# Patient Record
Sex: MALE | Race: OTHER | HISPANIC OR LATINO | URBAN - METROPOLITAN AREA
[De-identification: names, ages, dates, MRNs, and addresses within clinical notes are randomized per-mention and may not be internally consistent; named-entity substitution may affect disease eponyms.]

---

## 2017-03-31 ENCOUNTER — OUTPATIENT (OUTPATIENT)
Dept: OUTPATIENT SERVICES | Facility: HOSPITAL | Age: 77
LOS: 1 days | Discharge: HOME | End: 2017-03-31

## 2017-06-27 DIAGNOSIS — I10 ESSENTIAL (PRIMARY) HYPERTENSION: ICD-10-CM

## 2017-06-27 DIAGNOSIS — E78.2 MIXED HYPERLIPIDEMIA: ICD-10-CM

## 2017-06-27 DIAGNOSIS — E66.9 OBESITY, UNSPECIFIED: ICD-10-CM

## 2017-07-24 ENCOUNTER — OUTPATIENT (OUTPATIENT)
Dept: OUTPATIENT SERVICES | Facility: HOSPITAL | Age: 77
LOS: 1 days | Discharge: HOME | End: 2017-07-24

## 2017-07-25 DIAGNOSIS — K02.53 DENTAL CARIES ON PIT AND FISSURE SURFACE PENETRATING INTO PULP: ICD-10-CM

## 2017-10-18 ENCOUNTER — EMERGENCY (EMERGENCY)
Facility: HOSPITAL | Age: 77
LOS: 0 days | Discharge: HOME | End: 2017-10-18

## 2017-10-18 DIAGNOSIS — N23 UNSPECIFIED RENAL COLIC: ICD-10-CM

## 2017-10-18 DIAGNOSIS — R10.31 RIGHT LOWER QUADRANT PAIN: ICD-10-CM

## 2017-10-18 DIAGNOSIS — R00.1 BRADYCARDIA, UNSPECIFIED: ICD-10-CM

## 2017-10-18 DIAGNOSIS — E78.00 PURE HYPERCHOLESTEROLEMIA, UNSPECIFIED: ICD-10-CM

## 2017-10-18 DIAGNOSIS — Z79.899 OTHER LONG TERM (CURRENT) DRUG THERAPY: ICD-10-CM

## 2017-12-14 ENCOUNTER — EMERGENCY (EMERGENCY)
Facility: HOSPITAL | Age: 77
LOS: 0 days | Discharge: HOME | End: 2017-12-14
Admitting: FAMILY MEDICINE
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
## 2017-12-14 DIAGNOSIS — M79.89 OTHER SPECIFIED SOFT TISSUE DISORDERS: ICD-10-CM

## 2017-12-14 DIAGNOSIS — E78.00 PURE HYPERCHOLESTEROLEMIA, UNSPECIFIED: ICD-10-CM
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
## 2017-12-14 DIAGNOSIS — Z79.899 OTHER LONG TERM (CURRENT) DRUG THERAPY: ICD-10-CM
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
## 2017-12-27 ENCOUNTER — OUTPATIENT (OUTPATIENT)
Dept: OUTPATIENT SERVICES | Facility: HOSPITAL | Age: 77
LOS: 1 days | Discharge: HOME | End: 2017-12-27

## 2017-12-27 DIAGNOSIS — E78.2 MIXED HYPERLIPIDEMIA: ICD-10-CM

## 2017-12-27 DIAGNOSIS — I10 ESSENTIAL (PRIMARY) HYPERTENSION: ICD-10-CM

## 2018-02-06 ENCOUNTER — OUTPATIENT (OUTPATIENT)
Dept: OUTPATIENT SERVICES | Facility: HOSPITAL | Age: 78
LOS: 1 days | Discharge: HOME | End: 2018-02-06

## 2018-03-09 ENCOUNTER — OUTPATIENT (OUTPATIENT)
Dept: OUTPATIENT SERVICES | Facility: HOSPITAL | Age: 78
LOS: 1 days | Discharge: HOME | End: 2018-03-09

## 2018-04-06 ENCOUNTER — OUTPATIENT (OUTPATIENT)
Dept: OUTPATIENT SERVICES | Facility: HOSPITAL | Age: 78
LOS: 1 days | Discharge: HOME | End: 2018-04-06

## 2018-04-06 DIAGNOSIS — E78.00 PURE HYPERCHOLESTEROLEMIA, UNSPECIFIED: ICD-10-CM

## 2018-04-06 DIAGNOSIS — Z00.00 ENCOUNTER FOR GENERAL ADULT MEDICAL EXAMINATION WITHOUT ABNORMAL FINDINGS: ICD-10-CM

## 2018-04-06 DIAGNOSIS — I10 ESSENTIAL (PRIMARY) HYPERTENSION: ICD-10-CM

## 2018-04-24 ENCOUNTER — OUTPATIENT (OUTPATIENT)
Dept: OUTPATIENT SERVICES | Facility: HOSPITAL | Age: 78
LOS: 1 days | Discharge: HOME | End: 2018-04-24

## 2018-04-24 DIAGNOSIS — K02.53 DENTAL CARIES ON PIT AND FISSURE SURFACE PENETRATING INTO PULP: ICD-10-CM

## 2018-05-27 ENCOUNTER — INPATIENT (INPATIENT)
Facility: HOSPITAL | Age: 78
LOS: 1 days | Discharge: HOME HEALTH | End: 2018-05-29
Attending: HOSPITALIST | Admitting: HOSPITALIST

## 2018-05-27 VITALS
SYSTOLIC BLOOD PRESSURE: 177 MMHG | TEMPERATURE: 96 F | RESPIRATION RATE: 18 BRPM | OXYGEN SATURATION: 99 % | DIASTOLIC BLOOD PRESSURE: 76 MMHG | HEART RATE: 52 BPM

## 2018-05-27 DIAGNOSIS — Z98.890 OTHER SPECIFIED POSTPROCEDURAL STATES: Chronic | ICD-10-CM

## 2018-05-27 LAB
ALBUMIN SERPL ELPH-MCNC: 4.1 G/DL — SIGNIFICANT CHANGE UP (ref 3.5–5.2)
ALP SERPL-CCNC: 73 U/L — SIGNIFICANT CHANGE UP (ref 30–115)
ALT FLD-CCNC: 20 U/L — SIGNIFICANT CHANGE UP (ref 0–41)
ANION GAP SERPL CALC-SCNC: 13 MMOL/L — SIGNIFICANT CHANGE UP (ref 7–14)
APTT BLD: 25.2 SEC — LOW (ref 27–39.2)
AST SERPL-CCNC: 20 U/L — SIGNIFICANT CHANGE UP (ref 0–41)
BASOPHILS # BLD AUTO: 0.02 K/UL — SIGNIFICANT CHANGE UP (ref 0–0.2)
BASOPHILS NFR BLD AUTO: 0.3 % — SIGNIFICANT CHANGE UP (ref 0–1)
BILIRUB SERPL-MCNC: 0.7 MG/DL — SIGNIFICANT CHANGE UP (ref 0.2–1.2)
BUN SERPL-MCNC: 23 MG/DL — HIGH (ref 10–20)
CALCIUM SERPL-MCNC: 8.7 MG/DL — SIGNIFICANT CHANGE UP (ref 8.5–10.1)
CHLORIDE SERPL-SCNC: 107 MMOL/L — SIGNIFICANT CHANGE UP (ref 98–110)
CK MB CFR SERPL CALC: 3.9 NG/ML — SIGNIFICANT CHANGE UP (ref 0.6–6.3)
CK SERPL-CCNC: 306 U/L — HIGH (ref 0–225)
CO2 SERPL-SCNC: 24 MMOL/L — SIGNIFICANT CHANGE UP (ref 17–32)
CREAT SERPL-MCNC: 0.8 MG/DL — SIGNIFICANT CHANGE UP (ref 0.7–1.5)
EOSINOPHIL # BLD AUTO: 0.68 K/UL — SIGNIFICANT CHANGE UP (ref 0–0.7)
EOSINOPHIL NFR BLD AUTO: 9.5 % — HIGH (ref 0–8)
GLUCOSE SERPL-MCNC: 107 MG/DL — HIGH (ref 70–99)
HCT VFR BLD CALC: 39 % — LOW (ref 42–52)
HGB BLD-MCNC: 13.4 G/DL — LOW (ref 14–18)
IMM GRANULOCYTES NFR BLD AUTO: 0.3 % — SIGNIFICANT CHANGE UP (ref 0.1–0.3)
INR BLD: 0.98 RATIO — SIGNIFICANT CHANGE UP (ref 0.65–1.3)
LACTATE SERPL-SCNC: 1.1 MMOL/L — SIGNIFICANT CHANGE UP (ref 0.5–2.2)
LIDOCAIN IGE QN: 30 U/L — SIGNIFICANT CHANGE UP (ref 7–60)
LYMPHOCYTES # BLD AUTO: 2.78 K/UL — SIGNIFICANT CHANGE UP (ref 1.2–3.4)
LYMPHOCYTES # BLD AUTO: 38.7 % — SIGNIFICANT CHANGE UP (ref 20.5–51.1)
MAGNESIUM SERPL-MCNC: 2.2 MG/DL — SIGNIFICANT CHANGE UP (ref 1.8–2.4)
MCHC RBC-ENTMCNC: 30.2 PG — SIGNIFICANT CHANGE UP (ref 27–31)
MCHC RBC-ENTMCNC: 34.4 G/DL — SIGNIFICANT CHANGE UP (ref 32–37)
MCV RBC AUTO: 88 FL — SIGNIFICANT CHANGE UP (ref 80–94)
MONOCYTES # BLD AUTO: 0.64 K/UL — HIGH (ref 0.1–0.6)
MONOCYTES NFR BLD AUTO: 8.9 % — SIGNIFICANT CHANGE UP (ref 1.7–9.3)
NEUTROPHILS # BLD AUTO: 3.05 K/UL — SIGNIFICANT CHANGE UP (ref 1.4–6.5)
NEUTROPHILS NFR BLD AUTO: 42.3 % — SIGNIFICANT CHANGE UP (ref 42.2–75.2)
NRBC # BLD: 0 /100 WBCS — SIGNIFICANT CHANGE UP (ref 0–0)
NT-PROBNP SERPL-SCNC: 261 PG/ML — SIGNIFICANT CHANGE UP (ref 0–300)
PLATELET # BLD AUTO: 185 K/UL — SIGNIFICANT CHANGE UP (ref 130–400)
POTASSIUM SERPL-MCNC: 4.5 MMOL/L — SIGNIFICANT CHANGE UP (ref 3.5–5)
POTASSIUM SERPL-SCNC: 4.5 MMOL/L — SIGNIFICANT CHANGE UP (ref 3.5–5)
PROT SERPL-MCNC: 6.3 G/DL — SIGNIFICANT CHANGE UP (ref 6–8)
PROTHROM AB SERPL-ACNC: 10.6 SEC — SIGNIFICANT CHANGE UP (ref 9.95–12.87)
RBC # BLD: 4.43 M/UL — LOW (ref 4.7–6.1)
RBC # FLD: 13.3 % — SIGNIFICANT CHANGE UP (ref 11.5–14.5)
SODIUM SERPL-SCNC: 144 MMOL/L — SIGNIFICANT CHANGE UP (ref 135–146)
TROPONIN T SERPL-MCNC: <0.01 NG/ML — SIGNIFICANT CHANGE UP
WBC # BLD: 7.19 K/UL — SIGNIFICANT CHANGE UP (ref 4.8–10.8)
WBC # FLD AUTO: 7.19 K/UL — SIGNIFICANT CHANGE UP (ref 4.8–10.8)

## 2018-05-27 RX ORDER — ASPIRIN/CALCIUM CARB/MAGNESIUM 324 MG
325 TABLET ORAL ONCE
Qty: 0 | Refills: 0 | Status: COMPLETED | OUTPATIENT
Start: 2018-05-27 | End: 2018-05-27

## 2018-05-27 RX ORDER — ATORVASTATIN CALCIUM 80 MG/1
80 TABLET, FILM COATED ORAL AT BEDTIME
Qty: 0 | Refills: 0 | Status: DISCONTINUED | OUTPATIENT
Start: 2018-05-27 | End: 2018-05-29

## 2018-05-27 RX ORDER — NIFEDIPINE 30 MG
30 TABLET, EXTENDED RELEASE 24 HR ORAL DAILY
Qty: 0 | Refills: 0 | Status: DISCONTINUED | OUTPATIENT
Start: 2018-05-27 | End: 2018-05-28

## 2018-05-27 RX ORDER — ENOXAPARIN SODIUM 100 MG/ML
40 INJECTION SUBCUTANEOUS EVERY 24 HOURS
Qty: 0 | Refills: 0 | Status: DISCONTINUED | OUTPATIENT
Start: 2018-05-27 | End: 2018-05-29

## 2018-05-27 RX ORDER — TRAMADOL HYDROCHLORIDE 50 MG/1
50 TABLET ORAL ONCE
Qty: 0 | Refills: 0 | Status: DISCONTINUED | OUTPATIENT
Start: 2018-05-27 | End: 2018-05-27

## 2018-05-27 RX ORDER — ASPIRIN/CALCIUM CARB/MAGNESIUM 324 MG
81 TABLET ORAL DAILY
Qty: 0 | Refills: 0 | Status: DISCONTINUED | OUTPATIENT
Start: 2018-05-27 | End: 2018-05-29

## 2018-05-27 RX ORDER — TRAMADOL HYDROCHLORIDE 50 MG/1
50 TABLET ORAL THREE TIMES A DAY
Qty: 0 | Refills: 0 | Status: DISCONTINUED | OUTPATIENT
Start: 2018-05-27 | End: 2018-05-29

## 2018-05-27 RX ADMIN — TRAMADOL HYDROCHLORIDE 50 MILLIGRAM(S): 50 TABLET ORAL at 23:05

## 2018-05-27 RX ADMIN — Medication 325 MILLIGRAM(S): at 21:11

## 2018-05-27 NOTE — CONSULT NOTE ADULT - ATTENDING COMMENTS
Pt interviewed and examined he reports awakening Sunday morning at 5 am with right face, hand and foot numbness, no headache.   He acknowledged pins and needs.  He also reported at least 3 bouts of diarrhea Sunday.  He denies weakness or vertigo, though felt "dizzy".  He is feeling better but still feels a little "numb" on right side.    Neurologic exam at 8:15 on 5/28/18 shows full strength, symmetrically diminished reflexes bilaterally.  Essentially normal FTN and HTS.  No pronator drift.  Equal pain sensation bilaterally but mild decrease LT right face, arm, leg.    He is able to take a few steps.  Church-pike was negative.    Certainly may have been TIA, clearing.  No acute changes on head CT.    Recommend:  hydration given the diarrhea.  Telemetry.   Increase aspirin to 162 mg/day.  High intensity statin.  Tx HTN.  MRI/MRA brain w/o and MRA neck with and without contrast. Pt interviewed and examined he reports awakening Sunday morning at 5 am with right face, hand and foot numbness, no headache.   He acknowledged pins and needs.  He also reported at least 3 bouts of diarrhea Sunday.  He denies weakness or vertigo, though felt "dizzy".  He is feeling better but still feels a little "numb" on right side.    Neurologic exam at 8:15 on 5/28/18 shows full strength, symmetrically diminished reflexes bilaterally.  Essentially normal FTN and HTS.  No pronator drift.  Equal pain sensation bilaterally but mild decrease LT right face, arm, leg.    He is able to take a few steps.  Church-pike was negative.    Certainly may have been TIA, clearing.  No acute changes on head CT.    Recommend:  hydration given the diarrhea.  Telemetry.   Increase aspirin to 162 mg/day.  High intensity statin.  Tx HTN.  MRI/MRA brain w/o and MRA neck with and without contrast.  Okay for stroke unit.  Keep on telemetry.

## 2018-05-27 NOTE — CONSULT NOTE ADULT - SUBJECTIVE AND OBJECTIVE BOX
CC: RIGHT SIDED WEAKNESS AND NUMBNESS    HPI:  79 yo right handed male with pmh of htn, dld, sick sinus syndrome presents with cc of right sided weakness and numbness starting 5 am this morning. Patient states that he woke up at 5 am with numbness to the whole right side then noted that he was  frequently dropping things and had an unsteady gait. Around 11 am he also had chest pain for which he took nitroglycerin  . Symptoms of right  sided weakness and numbness  persisted so family brought him to the ED. Pt has a history of sick sinus syndrome/ bradycardia; pt sees Dr shea; Pt had a cath with nonobstructive findings. Denies speech visual deficits, nausea vomiting dizziness    Home medications  -Nitroglycerin  -nifedipine 30 mg ER Q 24  -Crestor 10mg q 24  -asa 81mg  -vit d     Social history  -Denies smoking alcohol or drug use      Neuro Exam:  Orientation: oriented to person, place time and situation, speech fluent  Cranial Nerves: visual fields full to confrontation, pupils equal round and reactive to light, extraocular motion intact, facial sensation intact symmetrically, face symmetrical, hearing was intact bilaterally, tongue and palate midline, head turning and shoulder shrug symmetric and there was no tongue deviation with protrusion.   Motor: 5/5 Throughout / mild right drift  Sensory exam: decreased sensation to the right face arm and leg  Coordination:. No dysmetria or limb ataxia  Deep tendon reflexes: Diminished  Gait: deferred    NIHSS: 2 ( right sensory loss and mild right ue drift)    Allergies    No Known Allergies    Intolerances      MEDICATIONS  (STANDING):  aspirin enteric coated 81 milliGRAM(s) Oral daily  atorvastatin 80 milliGRAM(s) Oral at bedtime  enoxaparin Injectable 40 milliGRAM(s) SubCutaneous every 24 hours  LORazepam   Injectable 2 milliGRAM(s) IntraMuscular once  NIFEdipine XL 30 milliGRAM(s) Oral daily    MEDICATIONS  (PRN):  traMADol 50 milliGRAM(s) Oral three times a day PRN Moderate Pain (4 - 6)      LABS:                        13.4   7.19  )-----------( 185      ( 27 May 2018 18:25 )             39.0     05-27    144  |  107  |  23<H>  ----------------------------<  107<H>  4.5   |  24  |  0.8    Ca    8.7      27 May 2018 18:25  Mg     2.2     05-27    TPro  6.3  /  Alb  4.1  /  TBili  0.7  /  DBili  x   /  AST  20  /  ALT  20  /  AlkPhos  73  05-27    PT/INR - ( 27 May 2018 18:25 )   PT: 10.60 sec;   INR: 0.98 ratio         PTT - ( 27 May 2018 18:25 )  PTT:25.2 sec        Neuro Imaging:  < from: CT Head No Cont (05.27.18 @ 18:49) >  FINDINGS:  Ventricles and sulci are compatible with parenchymal volume loss.      There is no acute intracranial hemorrhage, extra-axial fluid collections   or midline shift.      There are foci of diminished attenuation in the periventricular and   subcortical white matter which likely represent chronic microvascular   changes.    There is no depressed calvarial fracture. The mastoid air cells are   aerated.  The paranasal sinuses are aerated.    Beam hardening artifact is noted overlying the brain stem and posterior   fossa which is inherent to CT in this location.    IMPRESSION:    No acute intracranial hemorrhage, mass-effector midline shift.        < end of copied text >      EEG:     Echo:   Carotid Doppler: N/A  Telemetry:

## 2018-05-27 NOTE — CONSULT NOTE ADULT - ASSESSMENT
79 yo right handed male with pmh of htn, dld, sick sinus syndrome presents with cc of right sided weakness and numbness starting which was a/w chest pain and unsteady gait since 5 am this morning. NIHSS 2    R/O CVA    PLAN  -N/C MRI BRAIN  -MRA H/N  -ASA 81 MG AND LIPITOR 80 MG  -lipid profile and hbaic  -speech and swallow eval  -pt/rehab  -neuro checks frequently and call for acute change.  -Neuro attending note will follow

## 2018-05-27 NOTE — H&P ADULT - ASSESSMENT
# r/o CVA  - neuro cs  - aspirin & lipitor  - echo, lipid profile  - pt/rehab    # HTN/ DLD    # dvt ppx    # dispo # Right UE weakness; right facial & right UE numbness r/o CVA  - neuro cs  - aspirin & lipitor  - echo, lipid profile  - pt/rehab    # HTN/ DLD  - c/w nifedipine  - switched to lipitor 80mg    # dvt ppx    # dispo # Right UE weakness; right facial & right UE numbness r/o CVA  - neuro cs  - aspirin & lipitor  - echo, lipid profile  - pt/rehab; hba1c  - mri brain; mra head & neck  - neuro checks; tele monitoring    # HTN/ DLD  - c/w nifedipine  - switched to lipitor 80mg    # dvt ppx    # dispo 78 yom with pmh of htn, dld, sick sinus syndrome presents with cc of right sided facial numbness, right UE numbness & weakness since waking up 12 hours PTP at 5am in the [facility] with chest tightness    # Right UE weakness; right facial & right UE numbness r/o CVA  - neuro cs  - aspirin & lipitor  - echo, lipid profile  - pt/rehab; hba1c  - mri brain; mra head & neck  - neuro checks; tele monitoring    # Chest pain/ h/o bradycardia 2/2 sick sinus syndrome  - pt follows with dr shea;   - check echo, check CE * 3  - f/u cardio OP    # HTN/ DLD  - c/w nifedipine  - switched to lipitor 80mg    # dvt ppx  - sc lovenox    # dispo  - home

## 2018-05-27 NOTE — H&P ADULT - HISTORY OF PRESENT ILLNESS
78 yom with pmh of htn, dld, sick sinus syndrome presents with cc of right sided facial numbness, right UE numbness & weakness since waking up 12 hours PTP at 5am in the [facility] ass[facility] with chest tightness  - Pt woke up this morning at 5 am & felt right arm weakness, right UE & right facial numbness; pt was unable to hold things properly with his right hand & had a change in his gait;  - pt also had chest pain associated with his symptoms; His chest pain was similar to his usual chest pain for which he took nitroglycerin; but his chest pain persisted for many hour;  - Pt has a history of sick sinus syndrome/ bradycardia; pt sees Dr shea; pt had a cath with nonobstructive findings; however, pt gets occasional chest pains when he is bradycardic, for which he takes Sl Nitroglycerine which relieves his symptoms    - In Ed- asa 325mg; ct head neg;

## 2018-05-27 NOTE — H&P ADULT - NSHPLABSRESULTS_GEN_ALL_CORE
13.4   7.19  )-----------( 185      ( 27 May 2018 18:25 )             39.0       05-27    144  |  107  |  23<H>  ----------------------------<  107<H>  4.5   |  24  |  0.8    Ca    8.7      27 May 2018 18:25  Mg     2.2     05-27    TPro  6.3  /  Alb  4.1  /  TBili  0.7  /  DBili  x   /  AST  20  /  ALT  20  /  AlkPhos  73  05-27                  PT/INR - ( 27 May 2018 18:25 )   PT: 10.60 sec;   INR: 0.98 ratio         PTT - ( 27 May 2018 18:25 )  PTT:25.2 sec    Lactate Trend  05-27 @ 18:25 Lactate:1.1       CARDIAC MARKERS ( 27 May 2018 18:25 )  x     / <0.01 ng/mL / 306 U/L / x     / 3.9 ng/mL        CAPILLARY BLOOD GLUCOSE 13.4   7.19  )-----------( 185      ( 27 May 2018 18:25 )             39.0     05-27    144  |  107  |  23<H>  ----------------------------<  107<H>  4.5   |  24  |  0.8    Ca    8.7      27 May 2018 18:25  Mg     2.2     05-27    TPro  6.3  /  Alb  4.1  /  TBili  0.7  /  DBili  x   /  AST  20  /  ALT  20  /  AlkPhos  73  05-27        PT/INR - ( 27 May 2018 18:25 )   PT: 10.60 sec;   INR: 0.98 ratio       PTT - ( 27 May 2018 18:25 )  PTT:25.2 sec  Lactate Trend  05-27 @ 18:25 Lactate:1.1     CARDIAC MARKERS ( 27 May 2018 18:25 )  x     / <0.01 ng/mL / 306 U/L / x     / 3.9 ng/mL  < from: CT Head No Cont (05.27.18 @ 18:49) >    No acute intracranial hemorrhage, mass-effector midline shift.    < end of copied text > Vital Signs Last 24 Hrs  T(C): 35.6 (27 May 2018 17:17), Max: 35.6 (27 May 2018 17:17)  T(F): 96.1 (27 May 2018 17:17), Max: 96.1 (27 May 2018 17:17)  HR: 52 (27 May 2018 17:17) (52 - 52)  BP: 177/76 (27 May 2018 17:17) (177/76 - 177/76)  BP(mean): --  RR: 18 (27 May 2018 17:17) (18 - 18)  SpO2: 99% (27 May 2018 17:17) (99% - 99%)                           13.4    7.19  )-----------( 185      ( 27 May 2018 18:25 )             39.0     05-27    144  |  107  |  23<H>  ----------------------------<  107<H>  4.5   |  24  |  0.8    Ca    8.7      27 May 2018 18:25  Mg     2.2     05-27    TPro  6.3  /  Alb  4.1  /  TBili  0.7  /  DBili  x   /  AST  20  /  ALT  20  /  AlkPhos  73  05-27        PT/INR - ( 27 May 2018 18:25 )   PT: 10.60 sec;   INR: 0.98 ratio       PTT - ( 27 May 2018 18:25 )  PTT:25.2 sec  Lactate Trend  05-27 @ 18:25 Lactate:1.1     CARDIAC MARKERS ( 27 May 2018 18:25 )  x     / <0.01 ng/mL / 306 U/L / x     / 3.9 ng/mL  < from: CT Head No Cont (05.27.18 @ 18:49) >    No acute intracranial hemorrhage, mass-effector midline shift.    < end of copied text >

## 2018-05-27 NOTE — ED PROVIDER NOTE - NS ED ROS FT
· Constitutional [-]: no chills, no fever, no night sweats, no weight loss  · CARDIOVASCULAR: normal rate and rhythm, +chest pain and no edema.  · RESPIRATORY: no chest pain, no cough, and no shortness of breath.  · GASTROINTESTINAL: no abdominal pain, no bloating, no constipation, no diarrhea, no nausea and no vomiting.  · GENITOURINARY: no dysuria, no frequency, and no hematuria.  Neuro: R arm, R leg and R facial numbness/weakness  · ROS STATEMENT: all other ROS negative except as per HPI

## 2018-05-27 NOTE — ED PROVIDER NOTE - OBJECTIVE STATEMENT
79 yo M with h/o HTN, HL, bradycardia, renal stones p/w chest pain and neurologic sx. Pt states that since 5am this morning, when he woke up, he developed right facial, right arm and right leg numbness. Pt also c/o weakness to right arm and right leg and claims that he has been dropping things. Pt also states that he has been having intermittent episodes of right chest spasms of sharp pain. Pt states that he has not had similar sx in the past. Pt denies any fever/chills, ha, visual changes, slurred speech, abdominal pain, n/v/d. Pt also states that he has been having intermittent episodes of shortness of breath with exertion today.

## 2018-05-27 NOTE — ED PROVIDER NOTE - PHYSICAL EXAMINATION
· CONSTITUTIONAL: Well appearing, well nourished, awake, alert, oriented to person, place, time/situation and in no apparent distress.  · ENMT: Airway patent, Nasal mucosa clear. Mouth with normal mucosa. Throat has no vesicles, no oropharyngeal exudates and uvula is midline.  · CARDIAC: Normal rate, regular rhythm.  Heart sounds S1, S2.  No murmurs, rubs or gallops. +R chest wall TTP, no rashes noted  · RESPIRATORY: Breath sounds clear and equal bilaterally.  · GASTROINTESTINAL: Abdomen soft, non-tender, no guarding.  · MUSCULOSKELETAL: Spine appears normal, range of motion is not limited, no muscle or joint tenderness  · NEUROLOGICAL: Alert and oriented, CN II-XII intact, motor strength 4/5 in RUE and RLE, +sensation subjectively decreased in right arm/right face, +R pronator drift, +antalgic gait, normal finger to nose  · SKIN: Skin normal color for race, warm, dry and intact. No evidence of rash.  · PSYCHIATRIC: Alert and oriented to person, place, time/situation. normal mood and affect. no apparent risk to self or others.

## 2018-05-27 NOTE — ED PROVIDER NOTE - CARE PLAN
Principal Discharge DX:	Weakness of right arm  Secondary Diagnosis:	Weakness of right leg  Secondary Diagnosis:	Chest pain, unspecified type

## 2018-05-27 NOTE — ED PROVIDER NOTE - PROGRESS NOTE DETAILS
Spoke with neurology, , who states that pt can be placed on tele for further evaluation and will be f/u by neuro. Spoke with  who is also aware of pt.

## 2018-05-27 NOTE — ED PROVIDER NOTE - MEDICAL DECISION MAKING DETAILS
77 yo M with h/o HTN, HL, bradycardia, renal stones p/w chest pain and neurologic sx. Pt states that since 5am this morning, when he woke up, he developed right facial, right arm and right leg numbness and waekness. Pt noted with stable labs and CT. Spoke with neurology who agreed to admit to tele for further eval for cva vs tia

## 2018-05-27 NOTE — H&P ADULT - NSHPPHYSICALEXAM_GEN_ALL_CORE
GENERAL: NAD, speaks in full sentences, no signs of respiratory distress  HEAD:  Atraumatic, Normocephalic  EYES: EOMI, PERRLA, conjunctiva and sclera clear  NECK: Supple, No JVD  CHEST/LUNG: Clear to auscultation bilaterally; No wheeze; No crackles; No accessory muscles used  HEART: Regular rate and rhythm; No murmurs;   ABDOMEN: Soft, Nontender, Nondistended; Bowel sounds present; No guarding  EXTREMITIES:  2+ Peripheral Pulses, No cyanosis or edema  PSYCH: AAOx3  NEUROLOGY: non-focal  SKIN: No rashes or lesions GENERAL: NAD,  HEAD:  Atraumatic, Normocephalic  EYES: EOMI, PERRLA, conjunctiva and sclera clear  NECK: Supple, No JVD  CHEST/LUNG: Clear to auscultation bilaterally; No wheeze; No crackles; No accessory muscles used  HEART: Regular, shi  ABDOMEN: Soft, Nontender, Nondistended; Bowel sounds present; No guarding  EXTREMITIES:  2+ Peripheral Pulses, No cyanosis or edema  PSYCH: AAOx3  NEUROLOGY: rue- weakness 4/5; LUE- 5/5; LLE/RLL- 5/5; right facial & right UE numbness  SKIN: No rashes or lesions

## 2018-05-28 LAB
ANION GAP SERPL CALC-SCNC: 10 MMOL/L — SIGNIFICANT CHANGE UP (ref 7–14)
BUN SERPL-MCNC: 21 MG/DL — HIGH (ref 10–20)
CALCIUM SERPL-MCNC: 8.6 MG/DL — SIGNIFICANT CHANGE UP (ref 8.5–10.1)
CHLORIDE SERPL-SCNC: 105 MMOL/L — SIGNIFICANT CHANGE UP (ref 98–110)
CHOLEST SERPL-MCNC: 165 MG/DL — SIGNIFICANT CHANGE UP (ref 100–200)
CK MB CFR SERPL CALC: 3.4 NG/ML — SIGNIFICANT CHANGE UP (ref 0.6–6.3)
CK MB CFR SERPL CALC: 3.8 NG/ML — SIGNIFICANT CHANGE UP (ref 0.6–6.3)
CK SERPL-CCNC: 218 U/L — SIGNIFICANT CHANGE UP (ref 0–225)
CK SERPL-CCNC: 250 U/L — HIGH (ref 0–225)
CO2 SERPL-SCNC: 25 MMOL/L — SIGNIFICANT CHANGE UP (ref 17–32)
CREAT SERPL-MCNC: 0.9 MG/DL — SIGNIFICANT CHANGE UP (ref 0.7–1.5)
ESTIMATED AVERAGE GLUCOSE: 128 MG/DL — HIGH (ref 68–114)
GLUCOSE SERPL-MCNC: 104 MG/DL — HIGH (ref 70–99)
HBA1C BLD-MCNC: 6.1 % — HIGH (ref 4–5.6)
HCT VFR BLD CALC: 39 % — LOW (ref 42–52)
HDLC SERPL-MCNC: 34 MG/DL — LOW (ref 40–125)
HGB BLD-MCNC: 12.9 G/DL — LOW (ref 14–18)
LIPID PNL WITH DIRECT LDL SERPL: 95 MG/DL — SIGNIFICANT CHANGE UP (ref 4–129)
MCHC RBC-ENTMCNC: 29.5 PG — SIGNIFICANT CHANGE UP (ref 27–31)
MCHC RBC-ENTMCNC: 33.1 G/DL — SIGNIFICANT CHANGE UP (ref 32–37)
MCV RBC AUTO: 89.2 FL — SIGNIFICANT CHANGE UP (ref 80–94)
NRBC # BLD: 0 /100 WBCS — SIGNIFICANT CHANGE UP (ref 0–0)
PLATELET # BLD AUTO: 187 K/UL — SIGNIFICANT CHANGE UP (ref 130–400)
POTASSIUM SERPL-MCNC: 4.4 MMOL/L — SIGNIFICANT CHANGE UP (ref 3.5–5)
POTASSIUM SERPL-SCNC: 4.4 MMOL/L — SIGNIFICANT CHANGE UP (ref 3.5–5)
RBC # BLD: 4.37 M/UL — LOW (ref 4.7–6.1)
RBC # FLD: 13.5 % — SIGNIFICANT CHANGE UP (ref 11.5–14.5)
SODIUM SERPL-SCNC: 140 MMOL/L — SIGNIFICANT CHANGE UP (ref 135–146)
TOTAL CHOLESTEROL/HDL RATIO MEASUREMENT: 4.9 RATIO — SIGNIFICANT CHANGE UP (ref 4–5.5)
TRIGL SERPL-MCNC: 229 MG/DL — HIGH (ref 10–149)
TROPONIN T SERPL-MCNC: <0.01 NG/ML — SIGNIFICANT CHANGE UP
TROPONIN T SERPL-MCNC: <0.01 NG/ML — SIGNIFICANT CHANGE UP
WBC # BLD: 6.38 K/UL — SIGNIFICANT CHANGE UP (ref 4.8–10.8)
WBC # FLD AUTO: 6.38 K/UL — SIGNIFICANT CHANGE UP (ref 4.8–10.8)

## 2018-05-28 RX ORDER — LISINOPRIL 2.5 MG/1
10 TABLET ORAL DAILY
Qty: 0 | Refills: 0 | Status: DISCONTINUED | OUTPATIENT
Start: 2018-05-28 | End: 2018-05-29

## 2018-05-28 RX ADMIN — Medication 81 MILLIGRAM(S): at 11:10

## 2018-05-28 RX ADMIN — Medication 30 MILLIGRAM(S): at 06:04

## 2018-05-28 RX ADMIN — LISINOPRIL 10 MILLIGRAM(S): 2.5 TABLET ORAL at 15:49

## 2018-05-28 RX ADMIN — ENOXAPARIN SODIUM 40 MILLIGRAM(S): 100 INJECTION SUBCUTANEOUS at 06:01

## 2018-05-28 RX ADMIN — Medication 2 MILLIGRAM(S): at 13:06

## 2018-05-28 RX ADMIN — ATORVASTATIN CALCIUM 80 MILLIGRAM(S): 80 TABLET, FILM COATED ORAL at 22:29

## 2018-05-28 NOTE — CONSULT NOTE ADULT - ASSESSMENT
IMPRESSION: Rehab of gait ataxia / r/o cva    PRECAUTIONS: [  ] Cardiac  [  ] Respiratory  [  ] Seizures [  ] Contact Isolation  [  ] Droplet Isolation  [  ] Other    Weight Bearing Status:     RECOMMENDATION:    Out of Bed to Chair     DVT/Decubiti Prophylaxis    REHAB PLAN:     [ x  ] Bedside P/T 3-5 times a week   [ x  ]   Bedside O/T  2-3 times a week             [   ] No Rehab Therapy Indicated                   [ x  ]  Speech Therapy   Conditioning/ROM                                    ADL  Bed Mobility                                               Conditioning/ROM  Transfers                                                     Bed Mobility  Sitting /Standing Balance                         Transfers                                        Gait Training                                               Sitting/Standing Balance  Stair Training [   ]Applicable                    Home equipment Eval                                                                        Splinting  [   ] Only      GOALS:   ADL   [x   ]   Independent                    Transfers  [x   ] Independent                          Ambulation  [ x  ] Independent     [   x ] With device                            [   ]  CG                                                         [   ]  CG                                                                  [   ] CG                            [    ] Min A                                                   [   ] Min A                                                              [   ] Min  A          DISCHARGE PLAN:   [   ]  Good candidate for Intensive Rehabilitation/Hospital based-4A [facility]                                             Will tolerate 3hrs Intensive Rehab Daily                                       [    ]  Short Term Rehab in Skilled Nursing Facility                                       [    ]  Home with Outpatient or  services                                         [  x  ]  Possible Candidate for Intensive Hospital based Rehab

## 2018-05-28 NOTE — PROGRESS NOTE ADULT - ASSESSMENT
78M with Sick Sinus Syndrome, Diverticulosis presents with R facial numbness and weakness, as well as chest pain.     R Facial Numbness and weakness: r/o stroke  Neuro following  MRI, MRA head and neck  Echo: Normal  Currently on ASA and Lipitor   Speech and Swallow Eval   [facility] by Speech and Swallow team      Chest pain:   CE negative x3, no EKG changes     Sick Sinus Syndrome:   Follows Dr. Caraballo as out patient   Currently on Nifedipine  Will discuss with attending possibility of switching to ACE-I for BP control  CCB may be worsening bradycardia     HTN: Elevated  May add or switch completely to ACE-I     Full Code   If Neuro Work up negative likely to be discharged in 78M with Sick Sinus Syndrome, Diverticulosis presents with R facial numbness and weakness, as well as chest pain.     R Facial Numbness and weakness: r/o stroke  Neuro following  MRI, MRA head and neck  Echo: Normal  Currently on ASA and Lipitor   Speech and Swallow Eval   [facility] by Speech and Swallow team      Chest pain:   CE negative x3, no EKG changes     Sick Sinus Syndrome:   Follows Dr. Caraballo as out patient   Currently on Nifedipine  Will discuss with attending possibility of switching to ACE-I for BP control  CCB may be worsening bradycardia     HTN: Elevated  May add or switch completely to ACE-I     Full Code   If Neuro Work up negative likely to be discharged.     Attending Attestation:    Pt seen and examined. Case and Plan discussed with pt, family and resident at bedside. Agree with resident note as corrected above. Pt presented with Right Face Numbness and RUE weakness. Pt was noted to have hr 40's in ER. Pt has SSS and follows with Dr. Sparrow.     Plan:  Telemetry Monitoring  TTE noted per report  f/u MRI Brain   ASA, Statins, BP Control  PT/SNF Evaluation  IF MRI positive for acute CVA pls upgrade pt to STROKE UNIT.   Resume all other home meds / Orders reviewed.    Time 35min

## 2018-05-28 NOTE — CONSULT NOTE ADULT - SUBJECTIVE AND OBJECTIVE BOX
HPI:  78 yom with pmh of htn, dld, sick sinus syndrome presents with cc of right sided facial numbness, right UE numbness & weakness since waking up 12 hours PTP at 5am in the [facility] ass[facility] with chest tightness  - Pt woke up this morning at 5 am & felt right arm weakness, right UE & right facial numbness; pt was unable to hold things properly with his right hand & had a change in his gait;  - pt also had chest pain associated with his symptoms; His chest pain was similar to his usual chest pain for which he took nitroglycerin; but his chest pain persisted for many hour;  - Pt has a history of sick sinus syndrome/ bradycardia; pt sees Dr shea; pt had a cath with nonobstructive findings; however, pt gets occasional chest pains when he is bradycardic, for which he takes Sl Nitroglycerine which relieves his symptoms    - In Ed- asa 325mg; ct head neg; (27 May 2018 21:58), MRI of brain pending.      PAST MEDICAL & SURGICAL HISTORY:  Diverticulosis  Sick sinus syndrome  Nephrolithiasis  High cholesterol  Hypertension  S/P wrist surgery      Hospital Course:    TODAY'S SUBJECTIVE & REVIEW OF SYMPTOMS:     Constitutional WNL   Cardio WNL   Resp WNL   GI WNL  Heme WNL  Endo WNL  Skin WNL  MSK WNL  Neuro right side weakness/numbness  Cognitive WNL  Psych WNL      MEDICATIONS  (STANDING):  aspirin enteric coated 81 milliGRAM(s) Oral daily  atorvastatin 80 milliGRAM(s) Oral at bedtime  enoxaparin Injectable 40 milliGRAM(s) SubCutaneous every 24 hours  lisinopril 10 milliGRAM(s) Oral daily    MEDICATIONS  (PRN):  traMADol 50 milliGRAM(s) Oral three times a day PRN Moderate Pain (4 - 6)      FAMILY HISTORY:  No pertinent family history in first degree relatives      Allergies    No Known Allergies    Intolerances        SOCIAL HISTORY:    [  ] Etoh  [  ] Smoking  [  ] Substance abuse     Home Environment:  [  ] Home Alone  [ x ] Lives with Family  [  ] Home Health Aid    Dwelling:  [  ] Apartment  [x  ] Private House  [  ] Adult Home  [  ] Skilled Nursing Facility      [  ] Short Term  [  ] Long Term  [x  ] Stairs       Elevator [  ]    FUNCTIONAL STATUS PTA: (Check all that apply)  Ambulation: [ x  ]Independent    [  ] Dependent     [  ] Non-Ambulatory  Assistive Device: [  ] SA Cane  [  ]  Q Cane  [  ] Walker  [  ]  Wheelchair  ADL : [ x ] Independent  [  ]  Dependent       Vital Signs Last 24 Hrs  T(C): 36.4 (28 May 2018 08:03), Max: 36.4 (28 May 2018 08:03)  T(F): 97.5 (28 May 2018 08:03), Max: 97.5 (28 May 2018 08:03)  HR: 45 (28 May 2018 08:03) (44 - 52)  BP: 179/74 (28 May 2018 08:03) (160/72 - 179/74)  BP(mean): --  RR: 20 (28 May 2018 08:03) (18 - 20)  SpO2: 98% (28 May 2018 08:03) (96% - 99%)      PHYSICAL EXAM: Alert & Oriented X3  GENERAL: NAD, well-groomed, well-developed  HEAD:  Atraumatic, Normocephalic  CHEST/LUNG: Clear   HEART: Regular rate   ABDOMEN: Soft, Nontender  EXTREMITIES:  no calf tenderness    NERVOUS SYSTEM:  Cranial Nerves 2-12 intact [ x ] Abnormal  [  ]  ROM: WFL all extremities [x  ]  Abnormal [  ]  Motor Strength: WFL all extremities  [ x ]  Abnormal [  ]  Sensation: intact to light touch [  ] Abnormal [x  ]decreased light touch right side  Reflexes: Symmetric [  ]  Abnormal [  ]    FUNCTIONAL STATUS:  Bed Mobility: Independent [  ]  Supervision [  ]  Needs Assistance [x  ]  N/A [  ]  Transfers: Independent [  ]  Supervision [  ]  Needs Assistance [x  ]  N/A [  ]   Ambulation: Independent [  ]  Supervision [  ]  Needs Assistance [  ]  N/A [  ]  ADL: Independent [  ] Requires Assistance [  ] N/A [  ]      LABS:                        12.9   6.38  )-----------( 187      ( 28 May 2018 04:04 )             39.0     05-28    140  |  105  |  21<H>  ----------------------------<  104<H>  4.4   |  25  |  0.9    Ca    8.6      28 May 2018 04:04  Mg     2.2     05-27    TPro  6.3  /  Alb  4.1  /  TBili  0.7  /  DBili  x   /  AST  20  /  ALT  20  /  AlkPhos  73  05-27    PT/INR - ( 27 May 2018 18:25 )   PT: 10.60 sec;   INR: 0.98 ratio         PTT - ( 27 May 2018 18:25 )  PTT:25.2 sec      RADIOLOGY & ADDITIONAL STUDIES:    Assesment:

## 2018-05-28 NOTE — PROGRESS NOTE ADULT - SUBJECTIVE AND OBJECTIVE BOX
MICHELLE GÓMEZ 78y Male   MRN#: [id number]    Patient is a 78y old Male who presents with a chief complaint of r/o cva; right sided facial numbness, right UE numbness & weakness (27 May 2018 21:58)    Currently admitted to medicine with the primary diagnosis of Weakness of right arm     Today is hospital day 1d. This morning he is resting comfortably in bed and reports no new issues or overnight events.     PAST MEDICAL & SURGICAL HISTORY  Diverticulosis  Sick sinus syndrome  Nephrolithiasis  High cholesterol  Hypertension  S/P wrist surgery      ALLERGIES:  No Known Allergies      MEDICATIONS:  STANDING MEDICATIONS  aspirin enteric coated 81 milliGRAM(s) Oral daily  atorvastatin 80 milliGRAM(s) Oral at bedtime  enoxaparin Injectable 40 milliGRAM(s) SubCutaneous every 24 hours  NIFEdipine XL 30 milliGRAM(s) Oral daily    PRN MEDICATIONS  traMADol 50 milliGRAM(s) Oral three times a day PRN      VITALS:   T(F): 97.5  HR: 45  BP: 179/74  RR: 20  SpO2: 98%    LABS:                        12.9   6.38  )-----------( 187      ( 28 May 2018 04:04 )             39.0     05-28    140  |  105  |  21<H>  ----------------------------<  104<H>  4.4   |  25  |  0.9    Ca    8.6      28 May 2018 04:04  Mg     2.2     05-27    TPro  6.3  /  Alb  4.1  /  TBili  0.7  /  DBili  x   /  AST  20  /  ALT  20  /  AlkPhos  73  05-27    PT/INR - ( 27 May 2018 18:25 )   PT: 10.60 sec;   INR: 0.98 ratio         PTT - ( 27 May 2018 18:25 )  PTT:25.2 sec      Creatine Kinase, Serum: 218 U/L (05-28-18 @ 04:04)  Troponin T, Serum: <0.01 ng/mL (05-28-18 @ 04:04)  Creatine Kinase, Serum: 250 U/L <H> (05-27-18 @ 23:28)  Troponin T, Serum: <0.01 ng/mL (05-27-18 @ 23:28)  Troponin T, Serum: <0.01 ng/mL (05-27-18 @ 18:25)  Creatine Kinase, Serum: 306 U/L <H> (05-27-18 @ 18:25)  Lactate, Blood: 1.1 mmol/L (05-27-18 @ 18:25)      CARDIAC MARKERS ( 28 May 2018 04:04 )  x     / <0.01 ng/mL / 218 U/L / x     / 3.4 ng/mL  CARDIAC MARKERS ( 27 May 2018 23:28 )  x     / <0.01 ng/mL / 250 U/L / x     / 3.8 ng/mL  CARDIAC MARKERS ( 27 May 2018 18:25 )  x     / <0.01 ng/mL / 306 U/L / x     / 3.9 ng/mL        RADIOLOGY:  < from: Xray Chest 1 View AP/PA (05.27.18 @ 18:59) >  EXAM:  XR CHEST FRONTAL 1V            PROCEDURE DATE:  05/27/2018    INTERPRETATION:  CLINICAL INDICATION:  Chest pain    COMPARISON: Chest radiograph dated 2/14/2017    TECHNIQUE:  AP radiograph the chest. Satisfactory.         FINDINGS:    Support devices: None.                Cardiac/mediastinum/hilum: Stable.              Lung parenchyma/Pleura: No focal consolidation pleural effusion. There is   no pneumothorax.    Skeleton/soft tissues: Unchanged.     IMPRESSION:     No radiographic evidence of acute cardiopulmonary disease.    < end of copied text >    < from: Transthoracic Echocardiogram (05.28.18 @ 09:21) >  Summary:   1. Technically good study.   2. Normal global left ventricular systolic function.   3. Trace tricuspid regurgitation.   4. PSAP at least 35.    < end of copied text >        PHYSICAL EXAM:    GENERAL: NAD, well-developed, AAOx3  HEENT:  Atraumatic, Normocephalic. EOMI, PERRLA, conjunctiva and sclera clear  PULMONARY: Clear to auscultation bilaterally; No wheeze  CARDIOVASCULAR: Regular rate and rhythm; No murmurs, rubs, or gallops  GASTROINTESTINAL: Soft, Nontender, Nondistended; Bowel sounds present  MUSCULOSKELETAL: No clubbing, cyanosis, or edema  NEUROLOGY: decreased sensation to Right side of face, otherwise normal .  SKIN: No rashes or lesions

## 2018-05-29 ENCOUNTER — TRANSCRIPTION ENCOUNTER (OUTPATIENT)
Age: 78
End: 2018-05-29

## 2018-05-29 VITALS
TEMPERATURE: 99 F | DIASTOLIC BLOOD PRESSURE: 85 MMHG | HEART RATE: 49 BPM | OXYGEN SATURATION: 98 % | SYSTOLIC BLOOD PRESSURE: 145 MMHG | RESPIRATION RATE: 18 BRPM

## 2018-05-29 LAB
APPEARANCE UR: (no result)
BILIRUB UR-MCNC: NEGATIVE — SIGNIFICANT CHANGE UP
COLOR SPEC: YELLOW — SIGNIFICANT CHANGE UP
DIFF PNL FLD: NEGATIVE — SIGNIFICANT CHANGE UP
EPI CELLS # UR: (no result) /HPF
GLUCOSE UR QL: NEGATIVE MG/DL — SIGNIFICANT CHANGE UP
KETONES UR-MCNC: NEGATIVE — SIGNIFICANT CHANGE UP
LEUKOCYTE ESTERASE UR-ACNC: NEGATIVE — SIGNIFICANT CHANGE UP
NITRITE UR-MCNC: NEGATIVE — SIGNIFICANT CHANGE UP
PH UR: 7.5 — SIGNIFICANT CHANGE UP (ref 5–8)
PROT UR-MCNC: (no result) MG/DL
SP GR SPEC: 1.02 — SIGNIFICANT CHANGE UP (ref 1.01–1.03)
UROBILINOGEN FLD QL: 0.2 MG/DL — SIGNIFICANT CHANGE UP (ref 0.2–0.2)

## 2018-05-29 RX ORDER — LISINOPRIL 2.5 MG/1
1 TABLET ORAL
Qty: 30 | Refills: 0
Start: 2018-05-29 | End: 2018-06-27

## 2018-05-29 RX ORDER — ASPIRIN/CALCIUM CARB/MAGNESIUM 324 MG
243 TABLET ORAL ONCE
Qty: 0 | Refills: 0 | Status: COMPLETED | OUTPATIENT
Start: 2018-05-29 | End: 2018-05-29

## 2018-05-29 RX ORDER — NIFEDIPINE 30 MG
1 TABLET, EXTENDED RELEASE 24 HR ORAL
Qty: 0 | Refills: 0 | COMMUNITY

## 2018-05-29 RX ORDER — ASPIRIN/CALCIUM CARB/MAGNESIUM 324 MG
1 TABLET ORAL
Qty: 30 | Refills: 0
Start: 2018-05-29 | End: 2018-06-27

## 2018-05-29 RX ORDER — ROSUVASTATIN CALCIUM 5 MG/1
1 TABLET ORAL
Qty: 0 | Refills: 0 | COMMUNITY

## 2018-05-29 RX ORDER — ATORVASTATIN CALCIUM 80 MG/1
1 TABLET, FILM COATED ORAL
Qty: 30 | Refills: 0
Start: 2018-05-29 | End: 2018-06-27

## 2018-05-29 RX ADMIN — LISINOPRIL 10 MILLIGRAM(S): 2.5 TABLET ORAL at 08:57

## 2018-05-29 RX ADMIN — TRAMADOL HYDROCHLORIDE 50 MILLIGRAM(S): 50 TABLET ORAL at 00:53

## 2018-05-29 RX ADMIN — Medication 81 MILLIGRAM(S): at 12:19

## 2018-05-29 RX ADMIN — ENOXAPARIN SODIUM 40 MILLIGRAM(S): 100 INJECTION SUBCUTANEOUS at 05:12

## 2018-05-29 RX ADMIN — Medication 243 MILLIGRAM(S): at 17:25

## 2018-05-29 NOTE — DISCHARGE NOTE ADULT - CARE PROVIDER_API CALL
Zack Figueroa), Neurology; Neuromuscular Medicine  [address] [place] [address]  [address]  [place], [place] [id number]  Phone: [phone]  Fax: [phone]    Kevin Caraballo), Cardiovascular Disease; Interventional Cardiology  [address] [place] [address]  Phone: [phone]  Fax: [phone]    Corinne Guzman (DO), Family Medicine  [address] [place] [address]  Phone: [phone]  Fax: [phone]

## 2018-05-29 NOTE — PROGRESS NOTE ADULT - ASSESSMENT
78M with Sick Sinus Syndrome, Diverticulosis presents with R facial numbness and weakness, as well as chest pain.     R Facial Numbness and weakness: r/o stroke  Neuro: Cleared for DC, no further inpatient work up. Will likely need event monitor as out patient to assess for A-Fib   MRI: Small thalamic lacunar infarct, MRA head small irregularity likely Athersclerosis   Echo: Normal  Currently on ASA and Lipitor   Speech and Swallow Eval: Regular diet   PT/Rehab: possible 4A candidate     Bradycardia/Sick Sinus Syndrome:  HR improved  Currently off nifedipine and on Lisinopril   Cardio to follow with patient    May require PPM in future     Chest pain: Resolved   CE negative x3, no EKG changes    HTN:   Currently on ACE-I, better controlled     Full Code   Dispo: Likely d/c either home with PT or 4a rehab within 24 hours

## 2018-05-29 NOTE — DISCHARGE NOTE ADULT - PATIENT PORTAL LINK FT
You can access the [facility] Patient Portal, offered by [facility], by registering with the following website: [email or web address]

## 2018-05-29 NOTE — DISCHARGE NOTE ADULT - MEDICATION SUMMARY - MEDICATIONS TO STOP TAKING
I will STOP taking the medications listed below when I get home from the hospital:    NIFEdipine 30 mg oral tablet, extended release  -- 1 tab(s) by mouth once a day    rosuvastatin 10 mg oral tablet  -- 1 tab(s) by mouth once a day (at bedtime)

## 2018-05-29 NOTE — DISCHARGE NOTE ADULT - HOSPITAL COURSE
79 yo m with pmh of htn, dld, sick sinus syndrome presents with cc of right sided facial numbness, right UE numbness & weakness since waking up 12 hours PTP at 5am in the [facility] with chest tightness  - Pt woke up at 5 am & felt right arm weakness, right UE & right facial numbness; pt was unable to hold things properly with his right hand & had a change in his gait;  - pt also had chest pain associated with his symptoms; His chest pain was similar to his usual chest pain for which he took nitroglycerin; but his chest pain persisted for many hour;  - Pt has a history of sick sinus syndrome/ bradycardia; pt sees Dr shea; pt had a cath with nonobstructive findings; however, pt gets occasional chest pains when he is bradycardic, for which he takes Sl Nitroglycerine which relieves his symptoms.  MRI brain with small CVA. Aspirin increased to 325mg and Lipitor increased to 80mg. Nifedipine changed to Lisinopril. Pt ambulating on his own. Motor 5/5. Only sensory symptoms on Rt side remain. Cleared by Neuro and Cardiology for outpt f/u. Spent 45 min on discharge process.

## 2018-05-29 NOTE — CONSULT NOTE ADULT - ASSESSMENT
78M, PMHx SSS, HTN, nephrolithiasis, DLD p/w acute lacunar infarct and chest pain at rest which resolved spontaneously.  Cardiology consulted for symptomatic bradycardia.    #symptomatic bradycardia  - avoid AV node blocking agents  - follow up attending note for possibility of PPM placement 78M, PMHx SSS, HTN, nephrolithiasis, DLD p/w acute lacunar infarct and chest pain at rest which resolved spontaneously.  Cardiology consulted for symptomatic bradycardia.    #typical angina  - continue medical management with nitroglycerin PRN  - no other intervention in setting of CVA  - will discuss STEVE if neurology recommends one for the work up of CVA  - Pt may need event monitor on discharge     #symptomatic bradycardia  - avoid AV node blocking agents  - possible stress test as outpt, but not in setting of CVA 78M, PMHx SSS, HTN, nephrolithiasis, DLD p/w acute lacunar infarct and chest pain at rest which resolved spontaneously.  Cardiology consulted for symptomatic bradycardia.    #typical angina  - continue medical management with nitroglycerin PRN  - no other intervention in setting of CVA  - will discuss STEVE if neurology recommends one for the work up of CVA  - Pt may need event monitor on discharge will fu as outpt.     #symptomatic bradycardia  - avoid AV node blocking agents  - possible stress test as outpt, but not in setting of CVA

## 2018-05-29 NOTE — DISCHARGE NOTE ADULT - MEDICATION SUMMARY - MEDICATIONS TO TAKE
I will START or STAY ON the medications listed below when I get home from the hospital:    Aspirin Enteric Coated 325 mg oral delayed release tablet  -- 1 tab(s) by mouth once a day   -- Swallow whole.  Do not crush.  Take with food or milk.    -- Indication: For Stroke    lisinopril 10 mg oral tablet  -- 1 tab(s) by mouth once a day  -- Indication: For Hypertension    nitroglycerin 0.4 mg sublingual tablet  -- 1 tab(s) under tongue prn, As Needed  -- Indication: For in case of chest discomfort    atorvastatin 80 mg oral tablet  -- 1 tab(s) by mouth once a day (at bedtime)  -- Indication: For Stroke

## 2018-05-29 NOTE — PROGRESS NOTE ADULT - ASSESSMENT
79 yo right handed male with pmh of htn, dld, sick sinus syndrome presents with cc of right sided weakness and numbness starting which was a/w chest pain and unsteady gait found to have acute L thalamic infarct c/w pure sensory lacunar syndrome.  likely secondary to small vessel disease.      Plan:  - continue , Lipitor 80  - no further inpt neurologic w/u  - needs event monitor as outpt r/o PAFib  - ok to d/c once clear from cardiology/medicine w/ SSS  - f/u with neurology in 2 wks

## 2018-05-29 NOTE — SWALLOW BEDSIDE ASSESSMENT ADULT - SLP PERTINENT HISTORY OF CURRENT PROBLEM
Pt admitted w/ right sided weakness. PMH: diverticulosis, sick sinus syndrome, nephrolithiasis, high cholesterol, HTN

## 2018-05-29 NOTE — CONSULT NOTE ADULT - SUBJECTIVE AND OBJECTIVE BOX
78M, PMHx SSS (no PPM), DLD, HTN, nephrolithiasis p/w R UE weakness and chest pain for a few hours.  Pt found to have an acute lacunar infarct.  Pt says he gets occasional chest pain with moderate exertion, which is relieved with nitroglycerin, and says he's had bradycardia "forever".  Pt complains of occasional lightheadedness when he stand up or sits up abruptly, less than once a week, and denies palpitations, nausea/vomiting/diarrhea, headaches, shortness of breath. Pt denies taking beta blockers, non-dihydropyrimidine calcium channel blockers, digoxin amiodarone or any other AV node blocking agents.      PAST MEDICAL & SURGICAL HISTORY:  Diverticulosis  Sick sinus syndrome  Nephrolithiasis  High cholesterol  Hypertension  S/P wrist surgery    FAMILY HISTORY:  mother  of MI in her 50's  brother  of cardiac complications in his 50's    Allergies  No Known Allergies    Home Medications:  NIFEdipine 30 mg oral tablet, extended release: 1 tab(s) orally once a day (27 May 2018 22:03)  nitroglycerin 0.4 mg sublingual tablet: 1 tab(s) sublingual prn, As Needed (27 May 2018 22:28)  rosuvastatin 10 mg oral tablet: 1 tab(s) orally once a day (at bedtime) (27 May 2018 22:03)    MEDICATIONS  (STANDING):  aspirin enteric coated 81 milliGRAM(s) Oral daily  atorvastatin 80 milliGRAM(s) Oral at bedtime  enoxaparin Injectable 40 milliGRAM(s) SubCutaneous every 24 hours  lisinopril 10 milliGRAM(s) Oral daily    MEDICATIONS  (PRN):  traMADol 50 milliGRAM(s) Oral three times a day PRN Moderate Pain (4 - 6)    SOCIAL HISTORY:    [ ] Non-smoker  [ ] Smoker  [ ] Alcohol    REVIEW OF SYSTEMS:  per HPI.    PHYSICAL EXAM:  T(C): 36.7 (18 @ 07:19), Max: 36.7 (18 @ 05:08)  HR: 54 (18 @ 08:50) (49 - 57)  BP: 144/63 (18 @ 08:50) (107/60 - 163/69)  RR: 18 (18 @ 07:19) (18 - 20)  SpO2: 96% (18 @ 07:19) (96% - 98%)    General Appearance: Normal	  Cardiovascular: bradycardic, No JVD, No murmurs  Respiratory: Lungs clear to auscultation	  Psychiatry: A & O x 3, Mood & affect appropriate  Gastrointestinal:  Soft, Non-tender  Skin: No rashes, No ecchymoses, No cyanosis	  Neurologic: No focal deficits  Extremities: no edema, cyanosis, weakness  Vascular: Peripheral pulses palpable 2+ bilaterally      LABS:	 	                        12.9   6.38  )-----------( 187      ( 28 May 2018 04:04 )             39.0         140  |  105  |  21<H>  ----------------------------<  104<H>  4.4   |  25  |  0.9      144  |  107  |  23<H>  ----------------------------<  107<H>  4.5   |  24  |  0.8    Ca    8.6      28 May 2018 04:04  Ca    8.7      27 May 2018 18:25  Mg     2.2           TPro  6.3  /  Alb  4.1  /  TBili  0.7  /  DBili  x   /  AST  20  /  ALT  20  /  AlkPhos  73        Serum Pro-Brain Natriuretic Peptide: 261 pg/mL (18 @ 18:25)    Lipid Profile in AM (18 @ 04:04)    Total Cholesterol/HDL Ratio Measurement: 4.9 Ratio    Cholesterol, Serum: 165 mg/dL    Triglycerides, Serum: 229 mg/dL    HDL Cholesterol, Serum: 34 mg/dL    Hemoglobin A1C, Whole Blood: 6.1 % (18 @ 04:04)    TSH: none    CARDIAC MARKERS:  Troponin T, Serum: <0.01 ng/mL (18 @ 04:04)  Troponin T, Serum: <0.01 ng/mL (18 @ :28)  Troponin T, Serum: <0.01 ng/mL (18 @ 18:25)    TELEMETRY EVENTS: bradycardia as low as 44 bpm  ECG: sinus bradycardia 50 bpm    < from: Xray Chest 1 View AP/PA (18 @ 18:59) >  No radiographic evidence of acute cardiopulmonary disease.  	    PREVIOUS DIAGNOSTIC TESTING:    < from: Transthoracic Echocardiogram (18 @ 09:21) >  Summary:   1. Technically good study.   2. Normal global left ventricular systolic function.   3. Trace tricuspid regurgitation.   4. PSAP at least 35.    PHYSICIAN INTERPRETATION:  Left Ventricle: The left ventricular internal cavity size is normal. Left   ventricular wall thickness is normal. Global LV systolic function was   normal.  Right Ventricle: Normal right ventricular size and function.  Left Atrium: Mild to moderately enlarged left atrium.  Right Atrium: Normal right atrial size.  Pericardium: There is no evidence of pericardial effusion.  Mitral Valve: Mild mitral valve regurgitation is seen. The mitral valve   is normal in structure.  Tricuspid Valve: Structurally normal tricuspid valve, with normal leaflet   excursion. Trivial tricuspid regurgitation is visualized. PSAP at least   35.  Aortic Valve: No evidence of aortic valve regurgitation is seen. The   aortic valve is trileaflet. No evidence of aortic stenosis. Aortic valve   thickening with normal leaflet opening.  Aorta: The aortic root is normal in size and structure.      [facility]:  IMPRESSIONS: There is no significant coronary artery disease. Left ventricular   function is normal. 78M, PMHx SSS (no PPM), DLD, HTN, nephrolithiasis p/w R UE weakness and chest pain for a few hours.  Pt found to have an acute lacunar infarct.  Pt says he gets occasional chest pain with moderate exertion, which is relieved with nitroglycerin, and says he's had bradycardia "forever".  Pt complains of occasional lightheadedness when he stand up or sits up abruptly, less than once a week, and denies palpitations, nausea/vomiting/diarrhea, headaches, shortness of breath. Pt denies taking beta blockers, non-dihydropyrimidine calcium channel blockers, digoxin amiodarone or any other AV node blocking agents.      PAST MEDICAL & SURGICAL HISTORY:  Diverticulosis  Sick sinus syndrome  Nephrolithiasis  High cholesterol  Hypertension  S/P wrist surgery    FAMILY HISTORY:  mother  of MI in her 50's  brother  of cardiac complications in his 50's    Allergies  No Known Allergies    Home Medications:  NIFEdipine 30 mg oral tablet, extended release: 1 tab(s) orally once a day (27 May 2018 22:03)  nitroglycerin 0.4 mg sublingual tablet: 1 tab(s) sublingual prn, As Needed (27 May 2018 22:28)  rosuvastatin 10 mg oral tablet: 1 tab(s) orally once a day (at bedtime) (27 May 2018 22:03)    MEDICATIONS  (STANDING):  aspirin enteric coated 81 milliGRAM(s) Oral daily  atorvastatin 80 milliGRAM(s) Oral at bedtime  enoxaparin Injectable 40 milliGRAM(s) SubCutaneous every 24 hours  lisinopril 10 milliGRAM(s) Oral daily    MEDICATIONS  (PRN):  traMADol 50 milliGRAM(s) Oral three times a day PRN Moderate Pain (4 - 6)    SOCIAL HISTORY:    never smoker, rare alcohol use    REVIEW OF SYSTEMS:  per HPI.    PHYSICAL EXAM:  T(C): 36.7 (18 @ 07:19), Max: 36.7 (18 @ 05:08)  HR: 54 (18 @ 08:50) (49 - 57)  BP: 144/63 (18 @ 08:50) (107/60 - 163/69)  RR: 18 (18 @ 07:19) (18 - 20)  SpO2: 96% (18 @ 07:19) (96% - 98%)    General Appearance: Normal	  Cardiovascular: bradycardic, No JVD, No murmurs  Respiratory: Lungs clear to auscultation	  Psychiatry: A & O x 3, Mood & affect appropriate  Gastrointestinal:  Soft, Non-tender  Skin: No rashes, No ecchymoses, No cyanosis	  Neurologic: No focal deficits  Extremities: no edema, cyanosis, weakness  Vascular: Peripheral pulses palpable 2+ bilaterally      LABS:	 	                        12.9   6.38  )-----------( 187      ( 28 May 2018 04:04 )             39.0         140  |  105  |  21<H>  ----------------------------<  104<H>  4.4   |  25  |  0.9      144  |  107  |  23<H>  ----------------------------<  107<H>  4.5   |  24  |  0.8    Ca    8.6      28 May 2018 04:04  Ca    8.7      27 May 2018 18:25  Mg     2.2           TPro  6.3  /  Alb  4.1  /  TBili  0.7  /  DBili  x   /  AST  20  /  ALT  20  /  AlkPhos  73        Serum Pro-Brain Natriuretic Peptide: 261 pg/mL (18 @ 18:25)    Lipid Profile in AM (18 @ 04:04)    Total Cholesterol/HDL Ratio Measurement: 4.9 Ratio    Cholesterol, Serum: 165 mg/dL    Triglycerides, Serum: 229 mg/dL    HDL Cholesterol, Serum: 34 mg/dL    Hemoglobin A1C, Whole Blood: 6.1 % (18 @ 04:04)    TSH: none    CARDIAC MARKERS:  Troponin T, Serum: <0.01 ng/mL (18 @ 04:04)  Troponin T, Serum: <0.01 ng/mL (18 @ :28)  Troponin T, Serum: <0.01 ng/mL (18 @ 18:25)    TELEMETRY EVENTS: bradycardia as low as 44 bpm  ECG: sinus bradycardia 50 bpm    < from: Xray Chest 1 View AP/PA (18 @ 18:59) >  No radiographic evidence of acute cardiopulmonary disease.  	    PREVIOUS DIAGNOSTIC TESTING:    < from: Transthoracic Echocardiogram (18 @ 09:21) >  Summary:   1. Technically good study.   2. Normal global left ventricular systolic function.   3. Trace tricuspid regurgitation.   4. PSAP at least 35.    PHYSICIAN INTERPRETATION:  Left Ventricle: The left ventricular internal cavity size is normal. Left   ventricular wall thickness is normal. Global LV systolic function was   normal.  Right Ventricle: Normal right ventricular size and function.  Left Atrium: Mild to moderately enlarged left atrium.  Right Atrium: Normal right atrial size.  Pericardium: There is no evidence of pericardial effusion.  Mitral Valve: Mild mitral valve regurgitation is seen. The mitral valve   is normal in structure.  Tricuspid Valve: Structurally normal tricuspid valve, with normal leaflet   excursion. Trivial tricuspid regurgitation is visualized. PSAP at least   35.  Aortic Valve: No evidence of aortic valve regurgitation is seen. The   aortic valve is trileaflet. No evidence of aortic stenosis. Aortic valve   thickening with normal leaflet opening.  Aorta: The aortic root is normal in size and structure.       [facility]:  IMPRESSIONS: There is no significant coronary artery disease. Left ventricular   function is normal.

## 2018-05-29 NOTE — DISCHARGE NOTE ADULT - CARE PLAN
Principal Discharge DX:	Cerebrovascular accident (CVA), unspecified mechanism  Goal:	resolution/prevention  Assessment and plan of treatment:	Aspirin increased to 325mg and statin changed to Lipitor 80mg  Secondary Diagnosis:	Sinus bradycardia  Assessment and plan of treatment:	follow up with Dr. Fabian

## 2018-05-29 NOTE — DISCHARGE NOTE ADULT - CARE PROVIDERS DIRECT ADDRESSES
,[email or web address],DirectAddress_Unknown,DirectAddress_Unknown

## 2018-05-29 NOTE — DISCHARGE NOTE ADULT - NS AS DC STROKE ED MATERIALS
Stroke Warning Signs and Symptoms/Stroke Education Booklet/Call 911 for Stroke/Risk Factors for Stroke/Prescribed Medications/Need for Followup After Discharge

## 2018-05-29 NOTE — PROGRESS NOTE ADULT - ATTENDING COMMENTS
Patient seen and examined independently. I agree with the resident's note, physical exam, and plan except as below. Feels well. On my exam: motor 5/5 but paresthesias on Rt. D/w Dr. Calles - stable for outpt f/u. D/w pt, daughter (who also translated at pt's request), ASA changed to 325 and Lipitor 80 instead of rovastatin. D/w rehab - cleared for outpt f/u. Pt to f/u with PMD, Neuro, cards as outpt. Pt/daughter verbalized understanding of instructions.

## 2018-05-29 NOTE — SWALLOW BEDSIDE ASSESSMENT ADULT - SWALLOW EVAL: DIAGNOSIS
+ekaterina-pharyngeal swallow is WFL for regular solids and thin liquids. Pt is w/o any overt s/s of penetration/aspiration

## 2018-05-29 NOTE — PHYSICAL THERAPY INITIAL EVALUATION ADULT - GENERAL OBSERVATIONS, REHAB EVAL
patient encountered seated at edge of stretcher with daughter attending to hygiene  needs, + tele, +IV lock, NAD receptive to PT

## 2018-05-29 NOTE — PROGRESS NOTE ADULT - SUBJECTIVE AND OBJECTIVE BOX
Neurology Progress Note    Interval History:      HPI:  78 yom with pmh of htn, dld, sick sinus syndrome presents with cc of right sided facial numbness, right UE numbness & weakness since waking up 12 hours PTP at 5am in the [facility] with chest tightness  - Pt woke up this morning at 5 am & felt right arm weakness, right UE & right facial numbness; pt was unable to hold things properly with his right hand & had a change in his gait;  - pt also had chest pain associated with his symptoms; His chest pain was similar to his usual chest pain for which he took nitroglycerin; but his chest pain persisted for many hour;  - Pt has a history of sick sinus syndrome/ bradycardia; pt sees Dr shea; pt had a cath with nonobstructive findings; however, pt gets occasional chest pains when he is bradycardic, for which he takes Sl Nitroglycerine which relieves his symptoms    - In Ed- asa 325mg; ct head neg; (27 May 2018 21:58)    PAST MEDICAL & SURGICAL HISTORY:  Diverticulosis  Sick sinus syndrome  Nephrolithiasis  High cholesterol  Hypertension  S/P wrist surgery    Vital Signs Last 24 Hrs  T(C): 36.7 (29 May 2018 07:19), Max: 36.7 (29 May 2018 05:08)  T(F): 98.1 (29 May 2018 07:19), Max: 98.1 (29 May 2018 07:19)  HR: 54 (29 May 2018 08:50) (49 - 57)  BP: 144/63 (29 May 2018 08:50) (107/60 - 163/69)  BP(mean): --  RR: 18 (29 May 2018 07:19) (18 - 20)  SpO2: 96% (29 May 2018 07:19) (96% - 98%)    Neurological Exam:   Mental status: Awake, alert and oriented x3.  Recent and remote memory intact.  Naming, repetition and comprehension intact.  Attention/concentration intact.  No dysarthria, no aphasia.  Fund of knowledge appropriate.    Cranial nerves: Pupils equally round and reactive to light, visual fields full, no nystagmus, extraocular muscles intact, V1 through V3 intact bilaterally and symmetric, face symmetric, hearing intact to finger rub, palate elevation symmetric, tongue was midline.  Motor:  MRC grading 5/5 b/l UE/LE.   strength 5/5.  Normal tone and bulk.  No abnormal movements.    Sensation: Intact to light touch, proprioception, and pinprick.   Coordination: No dysmetria on finger-to-nose and heel-to-shin.  No dysdiadokinesia.  Reflexes: 2+ in bilateral UE/LE, downgoing toes bilaterally. (-) Vasquez.  Gait: Narrow and steady. No ataxia.  Romberg negative    aspirin enteric coated 81 milliGRAM(s) Oral daily  atorvastatin 80 milliGRAM(s) Oral at bedtime  enoxaparin Injectable 40 milliGRAM(s) SubCutaneous every 24 hours  lisinopril 10 milliGRAM(s) Oral daily  traMADol 50 milliGRAM(s) Oral three times a day PRN    Labs:  CBC Full  -  ( 28 May 2018 04:04 )  WBC Count : 6.38 K/uL  Hemoglobin : 12.9 g/dL  Hematocrit : 39.0 %  Platelet Count - Automated : 187 K/uL  Mean Cell Volume : 89.2 fL  Mean Cell Hemoglobin : 29.5 pg  Mean Cell Hemoglobin Concentration : 33.1 g/dL        140  |  105  |  21<H>  ----------------------------<  104<H>  4.4   |  25  |  0.9    Ca    8.6      28 May 2018 04:04  Mg     2.2         TPro  6.3  /  Alb  4.1  /  TBili  0.7  /  DBili  x   /  AST  20  /  ALT  20  /  AlkPhos  73  -    LIVER FUNCTIONS - ( 27 May 2018 18:25 )  Alb: 4.1 g/dL / Pro: 6.3 g/dL / ALK PHOS: 73 U/L / ALT: 20 U/L / AST: 20 U/L / GGT: x           PT/INR - ( 27 May 2018 18:25 )   PT: 10.60 sec;   INR: 0.98 ratio       PTT - ( 27 May 2018 18:25 )  PTT:25.2 sec  Urinalysis Basic - ( 29 May 2018 01:16 )    Color: Yellow / Appearance: Cloudy / S.025 / pH: x  Gluc: x / Ketone: Negative  / Bili: Negative / Urobili: 0.2 mg/dL   Blood: x / Protein: Trace mg/dL / Nitrite: Negative   Leuk Esterase: Negative / RBC: x / WBC x   Sq Epi: x / Non Sq Epi: Occasional /HPF / Bacteria: x    < from: MR Head No Cont (18 @ 14:00) >  IMPRESSION:  1.  Small subcentimeter acute lacunar infarct left thalamus.  2.  Periventricular and subcortical white matter chronic small vessel   ischemic changes.  3.  No acute intracranial hemorrhage.  MARIA DEJ ESUS MCKENNA M.D., ATTENDING RADIOLOGIST  This document has been electronicallysigned. May 28 2018 11:16PM  < end of copied text >    < from: MR Angio Neck w/ IV Cont (18 @ 14:00) >  IMPRESSION:   Unremarkable MRA ofthe neck with and without contrast.  MARIA DE JESUS MCKENNA M.D., ATTENDING RADIOLOGIST  This document has been electronically signed. May 28 2018 11:24PM  < end of copied text >    < from: Transthoracic Echocardiogram (18 @ 09:21) >  Summary:   1. Technically good study.   2. Normal global left ventricular systolic function.   3. Trace tricuspid regurgitation.   4. PSAP at least 35.  < end of copied text >    Assessment:  77 yo right handed male with pmh of htn, dld, sick sinus syndrome presents with cc of right sided weakness and numbness starting which was a/w chest pain and unsteady gait suspicious for CVA.      Plan: Neurology Progress Note    Interval History:  PT CURRENTLY STABLE UNCHANGED.  STILL HAS R HEMISENSORY LOSS BUT NO PROGRESSION.  OTHERWISE STABLE.  NO LIGHTHEADED OR DIZZINESS.     HPI:  78 yom with pmh of htn, dld, sick sinus syndrome presents with cc of right sided facial numbness, right UE numbness & weakness since waking up 12 hours PTP at 5am in the [facility] with chest tightness  - Pt woke up this morning at 5 am & felt right arm weakness, right UE & right facial numbness; pt was unable to hold things properly with his right hand & had a change in his gait;  - pt also had chest pain associated with his symptoms; His chest pain was similar to his usual chest pain for which he took nitroglycerin; but his chest pain persisted for many hour;  - Pt has a history of sick sinus syndrome/ bradycardia; pt sees Dr shea; pt had a cath with nonobstructive findings; however, pt gets occasional chest pains when he is bradycardic, for which he takes Sl Nitroglycerine which relieves his symptoms    - In Ed- asa 325mg; ct head neg; (27 May 2018 21:58)    PAST MEDICAL & SURGICAL HISTORY:  Diverticulosis  Sick sinus syndrome  Nephrolithiasis  High cholesterol  Hypertension  S/P wrist surgery    Vital Signs Last 24 Hrs  T(C): 36.7 (29 May 2018 07:19), Max: 36.7 (29 May 2018 05:08)  T(F): 98.1 (29 May 2018 07:19), Max: 98.1 (29 May 2018 07:19)  HR: 54 (29 May 2018 08:50) (49 - 57)  BP: 144/63 (29 May 2018 08:50) (107/60 - 163/69)  BP(mean): --  RR: 18 (29 May 2018 07:19) (18 - 20)  SpO2: 96% (29 May 2018 07:19) (96% - 98%)    Neurological Exam:   Mental status: Awake, alert and oriented x3.  Recent and remote memory intact.  Naming, repetition and comprehension intact.  Attention/concentration intact.  No dysarthria, no aphasia.  Fund of knowledge appropriate.    Cranial nerves: Pupils equally round and reactive to light, visual fields full, no nystagmus, extraocular muscles intact, V1 through V3 intact bilaterally and symmetric, face symmetric, hearing intact to finger rub, palate elevation symmetric, tongue was midline.  Motor:  MRC grading 5/5 b/l UE/LE.   strength 5/5.  Normal tone and bulk.  No abnormal movements.    Sensation: decreased light touch, proprioception, and pinprick R face/arm/leg  Coordination: No dysmetria on finger-to-nose and heel-to-shin.  No dysdiadokinesia.  Reflexes: 2+ in bilateral UE/LE, downgoing toes bilaterally. (-) Vasquez.    NIHSS 1    aspirin enteric coated 81 milliGRAM(s) Oral daily  atorvastatin 80 milliGRAM(s) Oral at bedtime  enoxaparin Injectable 40 milliGRAM(s) SubCutaneous every 24 hours  lisinopril 10 milliGRAM(s) Oral daily  traMADol 50 milliGRAM(s) Oral three times a day PRN    Labs:  CBC Full  -  ( 28 May 2018 04:04 )  WBC Count : 6.38 K/uL  Hemoglobin : 12.9 g/dL  Hematocrit : 39.0 %  Platelet Count - Automated : 187 K/uL  Mean Cell Volume : 89.2 fL  Mean Cell Hemoglobin : 29.5 pg  Mean Cell Hemoglobin Concentration : 33.1 g/dL        140  |  105  |  21<H>  ----------------------------<  104<H>  4.4   |  25  |  0.9    Ca    8.6      28 May 2018 04:04  Mg     2.2         TPro  6.3  /  Alb  4.1  /  TBili  0.7  /  DBili  x   /  AST  20  /  ALT  20  /  AlkPhos  73      LIVER FUNCTIONS - ( 27 May 2018 18:25 )  Alb: 4.1 g/dL / Pro: 6.3 g/dL / ALK PHOS: 73 U/L / ALT: 20 U/L / AST: 20 U/L / GGT: x           PT/INR - ( 27 May 2018 18:25 )   PT: 10.60 sec;   INR: 0.98 ratio       PTT - ( 27 May 2018 18:25 )  PTT:25.2 sec  Urinalysis Basic - ( 29 May 2018 01:16 )    Color: Yellow / Appearance: Cloudy / S.025 / pH: x  Gluc: x / Ketone: Negative  / Bili: Negative / Urobili: 0.2 mg/dL   Blood: x / Protein: Trace mg/dL / Nitrite: Negative   Leuk Esterase: Negative / RBC: x / WBC x   Sq Epi: x / Non Sq Epi: Occasional /HPF / Bacteria: x    < from: MR Head No Cont (18 @ 14:00) >  IMPRESSION:  1.  Small subcentimeter acute lacunar infarct left thalamus.  2.  Periventricular and subcortical white matter chronic small vessel   ischemic changes.  3.  No acute intracranial hemorrhage.  MARIA DE JESUS MCKENNA M.D., ATTENDING RADIOLOGIST  This document has been electronicallysigned. May 28 2018 11:16PM  < end of copied text >    < from: MR Angio Neck w/ IV Cont (18 @ 14:00) >  IMPRESSION:   Unremarkable MRA ofthe neck with and without contrast.  MARIA DE JESUS MCKENNA M.D., ATTENDING RADIOLOGIST  This document has been electronically signed. May 28 2018 11:24PM  < end of copied text >    < from: Transthoracic Echocardiogram (18 @ 09:21) >  Summary:   1. Technically good study.   2. Normal global left ventricular systolic function.   3. Trace tricuspid regurgitation.   4. PSAP at least 35.  < end of copied text >

## 2018-05-29 NOTE — PROGRESS NOTE ADULT - SUBJECTIVE AND OBJECTIVE BOX
MICHELLE GÓMEZ 78y Male   MRN#: [id number]    Patient is a 78y old Male who presents with a chief complaint of r/o cva; right sided facial numbness, right UE numbness & weakness (27 May 2018 21:58)    Currently admitted to medicine with the primary diagnosis of Weakness of right arm     Today is hospital day 2d. patient feels well with no new neurological symptoms. Eating and drinking well.     PAST MEDICAL & SURGICAL HISTORY  Diverticulosis  Sick sinus syndrome  Nephrolithiasis  High cholesterol  Hypertension  S/P wrist surgery      ALLERGIES:  No Known Allergies      MEDICATIONS:  STANDING MEDICATIONS  aspirin enteric coated 81 milliGRAM(s) Oral daily  atorvastatin 80 milliGRAM(s) Oral at bedtime  enoxaparin Injectable 40 milliGRAM(s) SubCutaneous every 24 hours  lisinopril 10 milliGRAM(s) Oral daily    PRN MEDICATIONS  traMADol 50 milliGRAM(s) Oral three times a day PRN      VITALS:   T(F): 98.1  HR: 54  BP: 144/63  RR: 18  SpO2: 96%    LABS:                        12.9   6.38  )-----------( 187      ( 28 May 2018 04:04 )             39.0     -    140  |  105  |  21<H>  ----------------------------<  104<H>  4.4   |  25  |  0.9    Ca    8.6      28 May 2018 04:04  Mg     2.2     -    TPro  6.3  /  Alb  4.1  /  TBili  0.7  /  DBili  x   /  AST  20  /  ALT  20  /  AlkPhos  73      PT/INR - ( 27 May 2018 18:25 )   PT: 10.60 sec;   INR: 0.98 ratio         PTT - ( 27 May 2018 18:25 )  PTT:25.2 sec  Urinalysis Basic - ( 29 May 2018 01:16 )    Color: Yellow / Appearance: Cloudy / S.025 / pH: x  Gluc: x / Ketone: Negative  / Bili: Negative / Urobili: 0.2 mg/dL   Blood: x / Protein: Trace mg/dL / Nitrite: Negative   Leuk Esterase: Negative / RBC: x / WBC x   Sq Epi: x / Non Sq Epi: Occasional /HPF / Bacteria: x            CARDIAC MARKERS ( 28 May 2018 04:04 )  x     / <0.01 ng/mL / 218 U/L / x     / 3.4 ng/mL  CARDIAC MARKERS ( 27 May 2018 23:28 )  x     / <0.01 ng/mL / 250 U/L / x     / 3.8 ng/mL  CARDIAC MARKERS ( 27 May 2018 18:25 )  x     / <0.01 ng/mL / 306 U/L / x     / 3.9 ng/mL        RADIOLOGY:  < from: MR Angio Head No Cont (18 @ 14:00) >  IMPRESSION:     1.  Slight irregularity of the distal vertebral arteries and proximal   basilar artery likely due to atherosclerotic changes.    2.  Otherwise unremarkable MRA of the brain without contrast.        < end of copied text >        < from: MR Head No Cont (18 @ 14:00) >  IMPRESSION:    1.  Small subcentimeter acute lacunar infarct left thalamus.    2.  Periventricular and subcortical white matter chronic small vessel   ischemic changes.    3.  No acute intracranial hemorrhage.    < end of copied text >      PHYSICAL EXAM:    GENERAL: NAD, well-developed, AAOx3  HEENT:  Atraumatic, Normocephalic. EOMI, PERRLA, conjunctiva and sclera clear  PULMONARY: Clear to auscultation bilaterally; No wheeze  CARDIOVASCULAR: Regular rate and rhythm; No murmurs, rubs, or gallops  GASTROINTESTINAL: Soft, Nontender, Nondistended; Bowel sounds present  MUSCULOSKELETAL:  No clubbing, cyanosis, or edema  NEUROLOGY: non-focal with exception of L hemisensory deficit on R face, RUE, and RLE. RLE 4/5 strength likely 2/2 arthritic knee pain

## 2018-05-29 NOTE — PHYSICAL THERAPY INITIAL EVALUATION ADULT - PERTINENT HX OF CURRENT PROBLEM, REHAB EVAL
Patient is 77 yo male to ED with  Patient is a 78y old Male who presents with a chief complaint of r/o cva; right sided facial numbness, right UE numbness & weaknes

## 2018-05-29 NOTE — DISCHARGE NOTE ADULT - PLAN OF CARE
resolution/prevention Aspirin increased to 325mg and statin changed to Lipitor 80mg follow up with Dr. Fabian

## 2018-05-30 LAB
CULTURE RESULTS: NO GROWTH — SIGNIFICANT CHANGE UP
SPECIMEN SOURCE: SIGNIFICANT CHANGE UP
TSH SERPL-MCNC: 0.71 UIU/ML — SIGNIFICANT CHANGE UP (ref 0.27–4.2)

## 2018-06-01 DIAGNOSIS — I63.9 CEREBRAL INFARCTION, UNSPECIFIED: ICD-10-CM

## 2018-06-01 DIAGNOSIS — I10 ESSENTIAL (PRIMARY) HYPERTENSION: ICD-10-CM

## 2018-06-01 DIAGNOSIS — R29.810 FACIAL WEAKNESS: ICD-10-CM

## 2018-06-01 DIAGNOSIS — Z87.442 PERSONAL HISTORY OF URINARY CALCULI: ICD-10-CM

## 2018-06-01 DIAGNOSIS — R29.701 NIHSS SCORE 1: ICD-10-CM

## 2018-06-01 DIAGNOSIS — I49.5 SICK SINUS SYNDROME: ICD-10-CM

## 2018-06-01 DIAGNOSIS — Z98.890 OTHER SPECIFIED POSTPROCEDURAL STATES: ICD-10-CM

## 2018-06-01 DIAGNOSIS — E78.5 HYPERLIPIDEMIA, UNSPECIFIED: ICD-10-CM

## 2018-06-01 DIAGNOSIS — R26.81 UNSTEADINESS ON FEET: ICD-10-CM

## 2018-06-25 ENCOUNTER — OUTPATIENT (OUTPATIENT)
Dept: OUTPATIENT SERVICES | Facility: HOSPITAL | Age: 78
LOS: 1 days | Discharge: HOME | End: 2018-06-25

## 2018-06-25 DIAGNOSIS — Z98.890 OTHER SPECIFIED POSTPROCEDURAL STATES: Chronic | ICD-10-CM

## 2018-06-25 PROBLEM — E78.00 PURE HYPERCHOLESTEROLEMIA, UNSPECIFIED: Chronic | Status: ACTIVE | Noted: 2018-05-27

## 2018-06-25 PROBLEM — I10 ESSENTIAL (PRIMARY) HYPERTENSION: Chronic | Status: ACTIVE | Noted: 2018-05-27

## 2018-07-06 ENCOUNTER — OUTPATIENT (OUTPATIENT)
Dept: OUTPATIENT SERVICES | Facility: HOSPITAL | Age: 78
LOS: 1 days | Discharge: HOME | End: 2018-07-06

## 2018-07-06 DIAGNOSIS — E78.2 MIXED HYPERLIPIDEMIA: ICD-10-CM

## 2018-07-06 DIAGNOSIS — I10 ESSENTIAL (PRIMARY) HYPERTENSION: ICD-10-CM

## 2018-07-06 DIAGNOSIS — Z98.890 OTHER SPECIFIED POSTPROCEDURAL STATES: Chronic | ICD-10-CM

## 2018-07-06 DIAGNOSIS — R73.02 IMPAIRED GLUCOSE TOLERANCE (ORAL): ICD-10-CM

## 2018-08-29 ENCOUNTER — OUTPATIENT (OUTPATIENT)
Dept: OUTPATIENT SERVICES | Facility: HOSPITAL | Age: 78
LOS: 1 days | Discharge: HOME | End: 2018-08-29

## 2018-08-29 DIAGNOSIS — Z98.890 OTHER SPECIFIED POSTPROCEDURAL STATES: Chronic | ICD-10-CM

## 2018-08-29 PROBLEM — N20.0 CALCULUS OF KIDNEY: Chronic | Status: ACTIVE | Noted: 2018-05-27

## 2018-08-29 PROBLEM — I49.5 SICK SINUS SYNDROME: Chronic | Status: ACTIVE | Noted: 2018-05-27

## 2018-08-29 PROBLEM — K57.90 DIVERTICULOSIS OF INTESTINE, PART UNSPECIFIED, WITHOUT PERFORATION OR ABSCESS WITHOUT BLEEDING: Chronic | Status: ACTIVE | Noted: 2018-05-27

## 2018-09-04 ENCOUNTER — OUTPATIENT (OUTPATIENT)
Dept: OUTPATIENT SERVICES | Facility: HOSPITAL | Age: 78
LOS: 1 days | Discharge: HOME | End: 2018-09-04

## 2018-09-04 DIAGNOSIS — Z98.890 OTHER SPECIFIED POSTPROCEDURAL STATES: Chronic | ICD-10-CM

## 2018-11-15 ENCOUNTER — OUTPATIENT (OUTPATIENT)
Dept: OUTPATIENT SERVICES | Facility: HOSPITAL | Age: 78
LOS: 1 days | Discharge: HOME | End: 2018-11-15

## 2018-11-15 DIAGNOSIS — I10 ESSENTIAL (PRIMARY) HYPERTENSION: ICD-10-CM

## 2018-11-15 DIAGNOSIS — Z98.890 OTHER SPECIFIED POSTPROCEDURAL STATES: Chronic | ICD-10-CM

## 2018-11-15 DIAGNOSIS — R73.02 IMPAIRED GLUCOSE TOLERANCE (ORAL): ICD-10-CM

## 2018-11-15 DIAGNOSIS — E78.2 MIXED HYPERLIPIDEMIA: ICD-10-CM

## 2018-11-19 ENCOUNTER — OUTPATIENT (OUTPATIENT)
Dept: OUTPATIENT SERVICES | Facility: HOSPITAL | Age: 78
LOS: 1 days | Discharge: HOME | End: 2018-11-19

## 2018-11-19 DIAGNOSIS — Z98.890 OTHER SPECIFIED POSTPROCEDURAL STATES: Chronic | ICD-10-CM

## 2018-11-19 DIAGNOSIS — I48.91 UNSPECIFIED ATRIAL FIBRILLATION: ICD-10-CM

## 2018-12-10 ENCOUNTER — OUTPATIENT (OUTPATIENT)
Dept: OUTPATIENT SERVICES | Facility: HOSPITAL | Age: 78
LOS: 1 days | Discharge: HOME | End: 2018-12-10

## 2018-12-10 DIAGNOSIS — K08.109 COMPLETE LOSS OF TEETH, UNSPECIFIED CAUSE, UNSPECIFIED CLASS: ICD-10-CM

## 2018-12-10 DIAGNOSIS — Z98.890 OTHER SPECIFIED POSTPROCEDURAL STATES: Chronic | ICD-10-CM

## 2018-12-17 ENCOUNTER — OUTPATIENT (OUTPATIENT)
Dept: OUTPATIENT SERVICES | Facility: HOSPITAL | Age: 78
LOS: 1 days | Discharge: HOME | End: 2018-12-17

## 2018-12-17 DIAGNOSIS — Z98.890 OTHER SPECIFIED POSTPROCEDURAL STATES: Chronic | ICD-10-CM

## 2018-12-18 DIAGNOSIS — K08.409 PARTIAL LOSS OF TEETH, UNSPECIFIED CAUSE, UNSPECIFIED CLASS: ICD-10-CM

## 2018-12-24 ENCOUNTER — OUTPATIENT (OUTPATIENT)
Dept: OUTPATIENT SERVICES | Facility: HOSPITAL | Age: 78
LOS: 1 days | Discharge: HOME | End: 2018-12-24

## 2018-12-24 DIAGNOSIS — Z98.890 OTHER SPECIFIED POSTPROCEDURAL STATES: Chronic | ICD-10-CM

## 2019-01-07 ENCOUNTER — OUTPATIENT (OUTPATIENT)
Dept: OUTPATIENT SERVICES | Facility: HOSPITAL | Age: 79
LOS: 1 days | Discharge: HOME | End: 2019-01-07

## 2019-01-07 DIAGNOSIS — Z98.890 OTHER SPECIFIED POSTPROCEDURAL STATES: Chronic | ICD-10-CM

## 2019-01-28 ENCOUNTER — OUTPATIENT (OUTPATIENT)
Dept: OUTPATIENT SERVICES | Facility: HOSPITAL | Age: 79
LOS: 1 days | Discharge: HOME | End: 2019-01-28

## 2019-01-28 DIAGNOSIS — Z98.890 OTHER SPECIFIED POSTPROCEDURAL STATES: Chronic | ICD-10-CM

## 2019-02-11 ENCOUNTER — OUTPATIENT (OUTPATIENT)
Dept: OUTPATIENT SERVICES | Facility: HOSPITAL | Age: 79
LOS: 1 days | Discharge: HOME | End: 2019-02-11

## 2019-02-11 DIAGNOSIS — Z98.890 OTHER SPECIFIED POSTPROCEDURAL STATES: Chronic | ICD-10-CM

## 2019-02-13 DIAGNOSIS — K08.409 PARTIAL LOSS OF TEETH, UNSPECIFIED CAUSE, UNSPECIFIED CLASS: ICD-10-CM

## 2019-02-25 ENCOUNTER — OUTPATIENT (OUTPATIENT)
Dept: OUTPATIENT SERVICES | Facility: HOSPITAL | Age: 79
LOS: 1 days | Discharge: HOME | End: 2019-02-25

## 2019-02-25 DIAGNOSIS — K08.409 PARTIAL LOSS OF TEETH, UNSPECIFIED CAUSE, UNSPECIFIED CLASS: ICD-10-CM

## 2019-02-25 DIAGNOSIS — Z98.890 OTHER SPECIFIED POSTPROCEDURAL STATES: Chronic | ICD-10-CM

## 2019-03-18 ENCOUNTER — OUTPATIENT (OUTPATIENT)
Dept: OUTPATIENT SERVICES | Facility: HOSPITAL | Age: 79
LOS: 1 days | Discharge: HOME | End: 2019-03-18

## 2019-03-18 DIAGNOSIS — Z98.890 OTHER SPECIFIED POSTPROCEDURAL STATES: Chronic | ICD-10-CM

## 2019-03-19 DIAGNOSIS — K08.409 PARTIAL LOSS OF TEETH, UNSPECIFIED CAUSE, UNSPECIFIED CLASS: ICD-10-CM

## 2019-05-13 ENCOUNTER — OUTPATIENT (OUTPATIENT)
Dept: OUTPATIENT SERVICES | Facility: HOSPITAL | Age: 79
LOS: 1 days | Discharge: HOME | End: 2019-05-13

## 2019-05-13 DIAGNOSIS — Z98.890 OTHER SPECIFIED POSTPROCEDURAL STATES: Chronic | ICD-10-CM

## 2019-05-15 ENCOUNTER — OUTPATIENT (OUTPATIENT)
Dept: OUTPATIENT SERVICES | Facility: HOSPITAL | Age: 79
LOS: 1 days | Discharge: HOME | End: 2019-05-15

## 2019-05-15 DIAGNOSIS — E78.2 MIXED HYPERLIPIDEMIA: ICD-10-CM

## 2019-05-15 DIAGNOSIS — Z98.890 OTHER SPECIFIED POSTPROCEDURAL STATES: Chronic | ICD-10-CM

## 2019-05-15 DIAGNOSIS — I10 ESSENTIAL (PRIMARY) HYPERTENSION: ICD-10-CM

## 2019-05-15 DIAGNOSIS — R73.02 IMPAIRED GLUCOSE TOLERANCE (ORAL): ICD-10-CM

## 2019-05-17 ENCOUNTER — EMERGENCY (EMERGENCY)
Facility: HOSPITAL | Age: 79
LOS: 0 days | Discharge: HOME | End: 2019-05-17
Attending: EMERGENCY MEDICINE | Admitting: EMERGENCY MEDICINE
Payer: COMMERCIAL

## 2019-05-17 VITALS
HEIGHT: 69 IN | RESPIRATION RATE: 18 BRPM | TEMPERATURE: 97 F | HEART RATE: 55 BPM | OXYGEN SATURATION: 97 % | SYSTOLIC BLOOD PRESSURE: 192 MMHG | WEIGHT: 203.93 LBS | DIASTOLIC BLOOD PRESSURE: 83 MMHG

## 2019-05-17 DIAGNOSIS — Z79.2 LONG TERM (CURRENT) USE OF ANTIBIOTICS: ICD-10-CM

## 2019-05-17 DIAGNOSIS — R10.32 LEFT LOWER QUADRANT PAIN: ICD-10-CM

## 2019-05-17 DIAGNOSIS — N20.0 CALCULUS OF KIDNEY: ICD-10-CM

## 2019-05-17 DIAGNOSIS — Z79.84 LONG TERM (CURRENT) USE OF ORAL HYPOGLYCEMIC DRUGS: ICD-10-CM

## 2019-05-17 DIAGNOSIS — Z98.890 OTHER SPECIFIED POSTPROCEDURAL STATES: Chronic | ICD-10-CM

## 2019-05-17 DIAGNOSIS — Z79.899 OTHER LONG TERM (CURRENT) DRUG THERAPY: ICD-10-CM

## 2019-05-17 DIAGNOSIS — I10 ESSENTIAL (PRIMARY) HYPERTENSION: ICD-10-CM

## 2019-05-17 DIAGNOSIS — E78.5 HYPERLIPIDEMIA, UNSPECIFIED: ICD-10-CM

## 2019-05-17 DIAGNOSIS — N45.1 EPIDIDYMITIS: ICD-10-CM

## 2019-05-17 LAB
ALBUMIN SERPL ELPH-MCNC: 4.3 G/DL — SIGNIFICANT CHANGE UP (ref 3.5–5.2)
ALP SERPL-CCNC: 82 U/L — SIGNIFICANT CHANGE UP (ref 30–115)
ALT FLD-CCNC: 45 U/L — HIGH (ref 0–41)
ANION GAP SERPL CALC-SCNC: 10 MMOL/L — SIGNIFICANT CHANGE UP (ref 7–14)
APPEARANCE UR: CLEAR — SIGNIFICANT CHANGE UP
AST SERPL-CCNC: 37 U/L — SIGNIFICANT CHANGE UP (ref 0–41)
BACTERIA # UR AUTO: SIGNIFICANT CHANGE UP /HPF
BASOPHILS # BLD AUTO: 0.01 K/UL — SIGNIFICANT CHANGE UP (ref 0–0.2)
BASOPHILS NFR BLD AUTO: 0.1 % — SIGNIFICANT CHANGE UP (ref 0–1)
BILIRUB SERPL-MCNC: 0.7 MG/DL — SIGNIFICANT CHANGE UP (ref 0.2–1.2)
BILIRUB UR-MCNC: NEGATIVE — SIGNIFICANT CHANGE UP
BUN SERPL-MCNC: 33 MG/DL — HIGH (ref 10–20)
CALCIUM SERPL-MCNC: 9.1 MG/DL — SIGNIFICANT CHANGE UP (ref 8.5–10.1)
CHLORIDE SERPL-SCNC: 102 MMOL/L — SIGNIFICANT CHANGE UP (ref 98–110)
CO2 SERPL-SCNC: 26 MMOL/L — SIGNIFICANT CHANGE UP (ref 17–32)
COLOR SPEC: YELLOW — SIGNIFICANT CHANGE UP
COMMENT - URINE: SIGNIFICANT CHANGE UP
CREAT SERPL-MCNC: 1.6 MG/DL — HIGH (ref 0.7–1.5)
DIFF PNL FLD: NEGATIVE — SIGNIFICANT CHANGE UP
EOSINOPHIL # BLD AUTO: 0.11 K/UL — SIGNIFICANT CHANGE UP (ref 0–0.7)
EOSINOPHIL NFR BLD AUTO: 1.1 % — SIGNIFICANT CHANGE UP (ref 0–8)
EPI CELLS # UR: ABNORMAL /HPF
GLUCOSE SERPL-MCNC: 196 MG/DL — HIGH (ref 70–99)
GLUCOSE UR QL: 250 MG/DL
HCT VFR BLD CALC: 40.2 % — LOW (ref 42–52)
HGB BLD-MCNC: 13.3 G/DL — LOW (ref 14–18)
HYALINE CASTS # UR AUTO: ABNORMAL /LPF
IMM GRANULOCYTES NFR BLD AUTO: 0.3 % — SIGNIFICANT CHANGE UP (ref 0.1–0.3)
KETONES UR-MCNC: 15
LACTATE SERPL-SCNC: 1.1 MMOL/L — SIGNIFICANT CHANGE UP (ref 0.5–2.2)
LEUKOCYTE ESTERASE UR-ACNC: NEGATIVE — SIGNIFICANT CHANGE UP
LYMPHOCYTES # BLD AUTO: 1.43 K/UL — SIGNIFICANT CHANGE UP (ref 1.2–3.4)
LYMPHOCYTES # BLD AUTO: 14.5 % — LOW (ref 20.5–51.1)
MCHC RBC-ENTMCNC: 29.9 PG — SIGNIFICANT CHANGE UP (ref 27–31)
MCHC RBC-ENTMCNC: 33.1 G/DL — SIGNIFICANT CHANGE UP (ref 32–37)
MCV RBC AUTO: 90.3 FL — SIGNIFICANT CHANGE UP (ref 80–94)
MONOCYTES # BLD AUTO: 0.6 K/UL — SIGNIFICANT CHANGE UP (ref 0.1–0.6)
MONOCYTES NFR BLD AUTO: 6.1 % — SIGNIFICANT CHANGE UP (ref 1.7–9.3)
NEUTROPHILS # BLD AUTO: 7.7 K/UL — HIGH (ref 1.4–6.5)
NEUTROPHILS NFR BLD AUTO: 77.9 % — HIGH (ref 42.2–75.2)
NITRITE UR-MCNC: NEGATIVE — SIGNIFICANT CHANGE UP
NRBC # BLD: 0 /100 WBCS — SIGNIFICANT CHANGE UP (ref 0–0)
PH UR: 5.5 — SIGNIFICANT CHANGE UP (ref 5–8)
PLATELET # BLD AUTO: 218 K/UL — SIGNIFICANT CHANGE UP (ref 130–400)
POTASSIUM SERPL-MCNC: 4.4 MMOL/L — SIGNIFICANT CHANGE UP (ref 3.5–5)
POTASSIUM SERPL-SCNC: 4.4 MMOL/L — SIGNIFICANT CHANGE UP (ref 3.5–5)
PROT SERPL-MCNC: 6.7 G/DL — SIGNIFICANT CHANGE UP (ref 6–8)
PROT UR-MCNC: 30 MG/DL
RBC # BLD: 4.45 M/UL — LOW (ref 4.7–6.1)
RBC # FLD: 14 % — SIGNIFICANT CHANGE UP (ref 11.5–14.5)
RBC CASTS # UR COMP ASSIST: SIGNIFICANT CHANGE UP /HPF
SODIUM SERPL-SCNC: 138 MMOL/L — SIGNIFICANT CHANGE UP (ref 135–146)
SP GR SPEC: 1.02 — SIGNIFICANT CHANGE UP (ref 1.01–1.03)
UROBILINOGEN FLD QL: 0.2 MG/DL — SIGNIFICANT CHANGE UP (ref 0.2–0.2)
WBC # BLD: 9.88 K/UL — SIGNIFICANT CHANGE UP (ref 4.8–10.8)
WBC # FLD AUTO: 9.88 K/UL — SIGNIFICANT CHANGE UP (ref 4.8–10.8)
WBC UR QL: SIGNIFICANT CHANGE UP /HPF

## 2019-05-17 PROCEDURE — 99284 EMERGENCY DEPT VISIT MOD MDM: CPT | Mod: 25

## 2019-05-17 PROCEDURE — 74177 CT ABD & PELVIS W/CONTRAST: CPT | Mod: 26

## 2019-05-17 RX ORDER — CIPROFLOXACIN LACTATE 400MG/40ML
1 VIAL (ML) INTRAVENOUS
Qty: 10 | Refills: 0
Start: 2019-05-17 | End: 2019-05-26

## 2019-05-17 RX ORDER — ONDANSETRON 8 MG/1
4 TABLET, FILM COATED ORAL ONCE
Refills: 0 | Status: COMPLETED | OUTPATIENT
Start: 2019-05-17 | End: 2019-05-17

## 2019-05-17 RX ORDER — KETOROLAC TROMETHAMINE 30 MG/ML
15 SYRINGE (ML) INJECTION ONCE
Refills: 0 | Status: DISCONTINUED | OUTPATIENT
Start: 2019-05-17 | End: 2019-05-17

## 2019-05-17 RX ORDER — NITROGLYCERIN 6.5 MG
1 CAPSULE, EXTENDED RELEASE ORAL
Qty: 0 | Refills: 0 | DISCHARGE

## 2019-05-17 RX ORDER — DONEPEZIL HYDROCHLORIDE 10 MG/1
80 TABLET, FILM COATED ORAL
Qty: 0 | Refills: 0 | DISCHARGE

## 2019-05-17 RX ORDER — SODIUM CHLORIDE 9 MG/ML
1000 INJECTION, SOLUTION INTRAVENOUS ONCE
Refills: 0 | Status: COMPLETED | OUTPATIENT
Start: 2019-05-17 | End: 2019-05-17

## 2019-05-17 RX ORDER — SERTRALINE 25 MG/1
1 TABLET, FILM COATED ORAL
Qty: 0 | Refills: 0 | DISCHARGE

## 2019-05-17 RX ADMIN — Medication 15 MILLIGRAM(S): at 02:47

## 2019-05-17 RX ADMIN — ONDANSETRON 4 MILLIGRAM(S): 8 TABLET, FILM COATED ORAL at 02:46

## 2019-05-17 RX ADMIN — SODIUM CHLORIDE 1000 MILLILITER(S): 9 INJECTION, SOLUTION INTRAVENOUS at 02:46

## 2019-05-17 NOTE — ED PROVIDER NOTE - NS ED ROS FT
Constitutional: NAD  Eyes: No visual changes, eye pain or discharge.  ENMT: No hearing changes, pain, discharge or infections. No neck pain or stiffness.  Cardiac: No chest pain, SOB or edema. No chest pain with exertion.  Respiratory: No cough or respiratory distress.   GI: + nausea, vomiting, abdominal pain.  : No dysuria, frequency or burning. +groin pain  MS: No myalgia, muscle weakness, joint pain or back pain.  Neuro: No headache or weakness. No LOC.  Skin: No skin rash.  Heme: No bruising

## 2019-05-17 NOTE — ED PROVIDER NOTE - OBJECTIVE STATEMENT
78M pmh HTN, HLD, sick sinus syndrome, CVA, diverticulosis, kidney stones p/w 1 day of worsening LLQ pain radiating to L groin, associated w/ n/v. Denies f/c, flank pain, diarrhea, dysuria, hematuria, testicular pain. States this does not feel like his prior stones.

## 2019-05-17 NOTE — ED PROVIDER NOTE - ATTENDING CONTRIBUTION TO CARE
78yM p/w L groin pain, intermittent x2d, worsened tonight.  Pt awoke from sleep c/o severe L groin pain ~midnight.  Associated w/ n/v.  No fever, diarrhea, dysuria, hematuria.    VS notable for HR 55 /83  CONSTITUTIONAL: well developed; well nourished; well appearing in mild distress  HEAD: normocephalic; atraumatic  EYES: no conjunctival injection, no scleral icterus  ENT: no nasal discharge; airway clear.  NECK: supple; non tender. + full passive ROM in all directions  CARD: bradycardic  RESP: breathing comfortably on RA w/o accessory muscle use  ABD: soft; non-distended; +LLQ tenderness, no rebound, no guarding, no pulsatile abdominal mass  : performed in presence of chaperone, normal external genitalia, +tenderness at L inguinal and overlying L epididymis, +cremasteric reflex on L side  EXT: moving all extremities spontaneously, normal ROM. No clubbing, cyanosis or edema  SKIN: warm and dry, no lesions noted  NEURO: alert, oriented, CN II-XII grossly intact, motor and sensory grossly intact, speech nonslurred, no focal deficits. GCS 15  PSYCH: calm, cooperative, appropriate, good eye contact, logical thought process, no apparent danger to self or others    bedside US w/ good b/l testicular flow, ? fluid near epididymis suggestive of epididymitis  labs  CTA/P given LLQ tenderness  pain meds  IVF

## 2019-05-17 NOTE — ED ADULT NURSE NOTE - NSHOSCREENINGQ1_ED_ALL_ED
1435  11/8/18  Therapeutic phlebotomy x 1 unit per protocol via left AC.  On completion, needle removed and pressure dsg applied.  Juice offered and taken by pt.  B/P 127/73, P 72.  Pt states no dizziness or weakness.  Ambulates from unit without difficulty with return leo't.  No adverse rxn.  Pt tolerated well.     No

## 2019-05-17 NOTE — ED ADULT NURSE NOTE - CHIEF COMPLAINT QUOTE
left groin pain, started 2 days ago and was relieved with tylenol but not today + nausea feels like a "claw" inside

## 2019-05-17 NOTE — ED ADULT NURSE NOTE - PMH
CVA (cerebral vascular accident)    Dementia    Diverticulosis    High cholesterol    Hypertension    Nephrolithiasis    Sick sinus syndrome

## 2019-05-17 NOTE — ED ADULT TRIAGE NOTE - CHIEF COMPLAINT QUOTE
left groin pain, started 2 days ago and was relieved with tylenol but not today + nausea feels like a "claw" inside left groin and testicular pain, started 2 days ago and was relieved with tylenol but not today + nausea feels like a "claw" inside

## 2019-05-17 NOTE — ED PROVIDER NOTE - PHYSICAL EXAMINATION
General: AOx4, in moderate pain, NAD, speaking in full sentences.   Skin: Warm and dry, no acute rash.   Head:  Normocephalic, atraumatic.   EENT: PERRL/EOMI, conjunctiva and sclera clear. MM moist, no nasal discharge.    Neck: Supple nt, no meningeal signs.   Lymph: No acute cervical lymphadenopathy  Heart RRR s1s2 nl, no rub/murmur.   Lungs- No retractions, BS equal, CTAB.   Abdomen: Soft mildly ttp over LLQ  : Normal appearing male genitalia, no masses/lesions/erythema/discharge. Testicles nttp. Cremasteric reflex intact. +ttp along L epididymus.  ?fluid around epidiymal cord on pocus. color flow demonstrated to both testes.   Extremities- moves all, +equal distal pulses, brisk cap refill. No LE edema, calves nttp b/l.  Neuro: Sensation wnl, CN 2-12 grossly intact. +5/5 muscle strength throughout.  Psych: Cooperative, appropriate

## 2019-06-11 ENCOUNTER — EMERGENCY (EMERGENCY)
Facility: HOSPITAL | Age: 79
LOS: 0 days | Discharge: HOME | End: 2019-06-11
Attending: EMERGENCY MEDICINE | Admitting: EMERGENCY MEDICINE
Payer: MEDICARE

## 2019-06-11 VITALS
OXYGEN SATURATION: 98 % | WEIGHT: 195.99 LBS | TEMPERATURE: 97 F | HEART RATE: 51 BPM | DIASTOLIC BLOOD PRESSURE: 80 MMHG | SYSTOLIC BLOOD PRESSURE: 187 MMHG | HEIGHT: 69 IN

## 2019-06-11 VITALS
DIASTOLIC BLOOD PRESSURE: 70 MMHG | SYSTOLIC BLOOD PRESSURE: 158 MMHG | OXYGEN SATURATION: 98 % | HEART RATE: 50 BPM | RESPIRATION RATE: 18 BRPM

## 2019-06-11 DIAGNOSIS — I86.1 SCROTAL VARICES: ICD-10-CM

## 2019-06-11 DIAGNOSIS — E78.5 HYPERLIPIDEMIA, UNSPECIFIED: ICD-10-CM

## 2019-06-11 DIAGNOSIS — Z98.890 OTHER SPECIFIED POSTPROCEDURAL STATES: Chronic | ICD-10-CM

## 2019-06-11 DIAGNOSIS — Z79.82 LONG TERM (CURRENT) USE OF ASPIRIN: ICD-10-CM

## 2019-06-11 DIAGNOSIS — N50.3 CYST OF EPIDIDYMIS: ICD-10-CM

## 2019-06-11 DIAGNOSIS — I10 ESSENTIAL (PRIMARY) HYPERTENSION: ICD-10-CM

## 2019-06-11 DIAGNOSIS — N20.0 CALCULUS OF KIDNEY: ICD-10-CM

## 2019-06-11 DIAGNOSIS — R10.30 LOWER ABDOMINAL PAIN, UNSPECIFIED: ICD-10-CM

## 2019-06-11 LAB
ALBUMIN SERPL ELPH-MCNC: 4.6 G/DL — SIGNIFICANT CHANGE UP (ref 3.5–5.2)
ALP SERPL-CCNC: 96 U/L — SIGNIFICANT CHANGE UP (ref 30–115)
ALT FLD-CCNC: 26 U/L — SIGNIFICANT CHANGE UP (ref 0–41)
ANION GAP SERPL CALC-SCNC: 10 MMOL/L — SIGNIFICANT CHANGE UP (ref 7–14)
APPEARANCE UR: CLEAR — SIGNIFICANT CHANGE UP
AST SERPL-CCNC: 29 U/L — SIGNIFICANT CHANGE UP (ref 0–41)
BASOPHILS # BLD AUTO: 0.01 K/UL — SIGNIFICANT CHANGE UP (ref 0–0.2)
BASOPHILS NFR BLD AUTO: 0.1 % — SIGNIFICANT CHANGE UP (ref 0–1)
BILIRUB SERPL-MCNC: 0.9 MG/DL — SIGNIFICANT CHANGE UP (ref 0.2–1.2)
BILIRUB UR-MCNC: NEGATIVE — SIGNIFICANT CHANGE UP
BUN SERPL-MCNC: 24 MG/DL — HIGH (ref 10–20)
CALCIUM SERPL-MCNC: 9.5 MG/DL — SIGNIFICANT CHANGE UP (ref 8.5–10.1)
CHLORIDE SERPL-SCNC: 101 MMOL/L — SIGNIFICANT CHANGE UP (ref 98–110)
CO2 SERPL-SCNC: 25 MMOL/L — SIGNIFICANT CHANGE UP (ref 17–32)
COLOR SPEC: YELLOW — SIGNIFICANT CHANGE UP
CREAT SERPL-MCNC: 1.5 MG/DL — SIGNIFICANT CHANGE UP (ref 0.7–1.5)
DIFF PNL FLD: NEGATIVE — SIGNIFICANT CHANGE UP
EOSINOPHIL # BLD AUTO: 0.1 K/UL — SIGNIFICANT CHANGE UP (ref 0–0.7)
EOSINOPHIL NFR BLD AUTO: 1.1 % — SIGNIFICANT CHANGE UP (ref 0–8)
EPI CELLS # UR: ABNORMAL /HPF
GLUCOSE SERPL-MCNC: 140 MG/DL — HIGH (ref 70–99)
GLUCOSE UR QL: NEGATIVE MG/DL — SIGNIFICANT CHANGE UP
HCT VFR BLD CALC: 41.8 % — LOW (ref 42–52)
HGB BLD-MCNC: 13.5 G/DL — LOW (ref 14–18)
IMM GRANULOCYTES NFR BLD AUTO: 0.3 % — SIGNIFICANT CHANGE UP (ref 0.1–0.3)
KETONES UR-MCNC: 40
LACTATE SERPL-SCNC: 2 MMOL/L — SIGNIFICANT CHANGE UP (ref 0.5–2.2)
LEUKOCYTE ESTERASE UR-ACNC: NEGATIVE — SIGNIFICANT CHANGE UP
LIDOCAIN IGE QN: 40 U/L — SIGNIFICANT CHANGE UP (ref 7–60)
LYMPHOCYTES # BLD AUTO: 1.1 K/UL — LOW (ref 1.2–3.4)
LYMPHOCYTES # BLD AUTO: 12.2 % — LOW (ref 20.5–51.1)
MCHC RBC-ENTMCNC: 29.8 PG — SIGNIFICANT CHANGE UP (ref 27–31)
MCHC RBC-ENTMCNC: 32.3 G/DL — SIGNIFICANT CHANGE UP (ref 32–37)
MCV RBC AUTO: 92.3 FL — SIGNIFICANT CHANGE UP (ref 80–94)
MONOCYTES # BLD AUTO: 0.65 K/UL — HIGH (ref 0.1–0.6)
MONOCYTES NFR BLD AUTO: 7.2 % — SIGNIFICANT CHANGE UP (ref 1.7–9.3)
NEUTROPHILS # BLD AUTO: 7.11 K/UL — HIGH (ref 1.4–6.5)
NEUTROPHILS NFR BLD AUTO: 79.1 % — HIGH (ref 42.2–75.2)
NITRITE UR-MCNC: NEGATIVE — SIGNIFICANT CHANGE UP
NRBC # BLD: 0 /100 WBCS — SIGNIFICANT CHANGE UP (ref 0–0)
PH UR: 7.5 — SIGNIFICANT CHANGE UP (ref 5–8)
PLATELET # BLD AUTO: 213 K/UL — SIGNIFICANT CHANGE UP (ref 130–400)
POTASSIUM SERPL-MCNC: 4.9 MMOL/L — SIGNIFICANT CHANGE UP (ref 3.5–5)
POTASSIUM SERPL-SCNC: 4.9 MMOL/L — SIGNIFICANT CHANGE UP (ref 3.5–5)
PROT SERPL-MCNC: 6.4 G/DL — SIGNIFICANT CHANGE UP (ref 6–8)
PROT UR-MCNC: 30 MG/DL
RBC # BLD: 4.53 M/UL — LOW (ref 4.7–6.1)
RBC # FLD: 13.8 % — SIGNIFICANT CHANGE UP (ref 11.5–14.5)
RBC CASTS # UR COMP ASSIST: SIGNIFICANT CHANGE UP /HPF
SODIUM SERPL-SCNC: 136 MMOL/L — SIGNIFICANT CHANGE UP (ref 135–146)
SP GR SPEC: 1.02 — SIGNIFICANT CHANGE UP (ref 1.01–1.03)
UROBILINOGEN FLD QL: 0.2 MG/DL — SIGNIFICANT CHANGE UP (ref 0.2–0.2)
WBC # BLD: 9 K/UL — SIGNIFICANT CHANGE UP (ref 4.8–10.8)
WBC # FLD AUTO: 9 K/UL — SIGNIFICANT CHANGE UP (ref 4.8–10.8)
WBC UR QL: SIGNIFICANT CHANGE UP /HPF

## 2019-06-11 PROCEDURE — 74177 CT ABD & PELVIS W/CONTRAST: CPT | Mod: 26

## 2019-06-11 PROCEDURE — 99285 EMERGENCY DEPT VISIT HI MDM: CPT

## 2019-06-11 PROCEDURE — 76870 US EXAM SCROTUM: CPT | Mod: 26

## 2019-06-11 RX ORDER — MORPHINE SULFATE 50 MG/1
4 CAPSULE, EXTENDED RELEASE ORAL ONCE
Refills: 0 | Status: DISCONTINUED | OUTPATIENT
Start: 2019-06-11 | End: 2019-06-11

## 2019-06-11 RX ORDER — ONDANSETRON 8 MG/1
4 TABLET, FILM COATED ORAL ONCE
Refills: 0 | Status: COMPLETED | OUTPATIENT
Start: 2019-06-11 | End: 2019-06-11

## 2019-06-11 RX ORDER — TAMSULOSIN HYDROCHLORIDE 0.4 MG/1
1 CAPSULE ORAL
Qty: 7 | Refills: 0
Start: 2019-06-11 | End: 2019-06-17

## 2019-06-11 RX ORDER — SODIUM CHLORIDE 9 MG/ML
1000 INJECTION INTRAMUSCULAR; INTRAVENOUS; SUBCUTANEOUS ONCE
Refills: 0 | Status: COMPLETED | OUTPATIENT
Start: 2019-06-11 | End: 2019-06-11

## 2019-06-11 RX ADMIN — ONDANSETRON 4 MILLIGRAM(S): 8 TABLET, FILM COATED ORAL at 21:18

## 2019-06-11 RX ADMIN — SODIUM CHLORIDE 1000 MILLILITER(S): 9 INJECTION INTRAMUSCULAR; INTRAVENOUS; SUBCUTANEOUS at 18:12

## 2019-06-11 RX ADMIN — ONDANSETRON 4 MILLIGRAM(S): 8 TABLET, FILM COATED ORAL at 18:33

## 2019-06-11 RX ADMIN — MORPHINE SULFATE 4 MILLIGRAM(S): 50 CAPSULE, EXTENDED RELEASE ORAL at 18:33

## 2019-06-11 RX ADMIN — MORPHINE SULFATE 4 MILLIGRAM(S): 50 CAPSULE, EXTENDED RELEASE ORAL at 21:09

## 2019-06-11 NOTE — ED PROVIDER NOTE - ATTENDING CONTRIBUTION TO CARE
79yM recent ED visit 2mo ago for L groin and LLQ pain (found to have L UVJ stone and epididymitis) p/w similar pain - L thigh/groin radiating to LLQ, worsened today.  No fevers.  +fishy odor to urine as per pt/family.    VS notable for HR 51 /80  CONSTITUTIONAL: well developed; well nourished; well appearing in no acute distress  HEAD: normocephalic; atraumatic  EYES: no conjunctival injection, no scleral icterus  ENT: no nasal discharge; airway clear.  NECK: supple; non tender. + full passive ROM in all directions  CARD: bradycardic, warm and well perfused  RESP: breathing comfortably on RA, speaking in full sentences w/o distress  ABD: soft; non-distended; non-tender. No rebound, no guarding, no pulsatile abdominal mass  : performed in presence of chaperone, normal external genitalia, no penile lesions, no scrotal swelling or erythema or lesions +L testicular tenderness  EXT: moving all extremities spontaneously, normal ROM. No clubbing, cyanosis or edema, +thigh tenderness w/o overlying swelling, erythema or lesions  SKIN: warm and dry, no lesions noted  NEURO: alert, oriented, CN II-XII grossly intact, motor and sensory grossly intact, speech nonslurred, no focal deficits. GCS 15  PSYCH: calm, cooperative, appropriate, good eye contact, logical thought process, no apparent danger to self or others

## 2019-06-11 NOTE — ED PROVIDER NOTE - NSFOLLOWUPINSTRUCTIONS_ED_ALL_ED_FT
[facility]ilmanianCanaam[facility] Deacon[facility] HospitalssianSpanishTagalogTraditional Memorial HospitaltLourdes Medical Center    Renal Colic  ImageRenal colic is pain that is caused by passing a kidney stone. The pain can be sharp and severe. It may be felt in the back, abdomen, side (flank), or groin. It can cause nausea. Renal colic can come and go.    Follow these instructions at home:  Watch your condition for any changes. The following actions may help to lessen any discomfort that you are feeling:    Take medicines only as directed by your health care provider.  Ask your health care provider if it is okay to take over-the-counter pain medicine.  Drink enough fluid to keep your urine clear or pale yellow. Drink 6–8 glasses of water each day.  Limit the amount of salt that you eat to less than 2 grams per day.  Reduce the amount of protein in your diet. Eat less meat, fish, nuts, and dairy.  Avoid foods such as spinach, rhubarb, nuts, or bran. These may make kidney stones more likely to form.    Contact a health care provider if:  You have a fever or chills.  Your urine smells bad or looks cloudy.  You have pain or burning when you pass urine.  Get help right away if:  Your flank pain or groin pain suddenly worsens.  You become confused or disoriented or you lose consciousness.  This information is not intended to replace advice given to you by your health care provider. Make sure you discuss any questions you have with your health care provider.

## 2019-06-11 NOTE — ED PROVIDER NOTE - CARE PROVIDER_API CALL
Bal Archer)  Urology  [address] [place] [address], [address]  [place] [address]  Phone: [phone]  Fax: [phone]  Follow Up Time:

## 2019-06-11 NOTE — ED PROVIDER NOTE - CARE PROVIDERS DIRECT ADDRESSES
[email or web address]

## 2019-06-11 NOTE — ED PROVIDER NOTE - CARE PLAN
Principal Discharge DX:	Nephrolithiasis  Secondary Diagnosis:	Varicocele present on ultrasound of scrotum  Secondary Diagnosis:	Epididymal cyst

## 2019-06-11 NOTE — ED PROVIDER NOTE - OBJECTIVE STATEMENT
80 yo male with h/o HTN, HLD, sick sinus syndrome, CVA, diverticulosis, kidney stones presents to the ED c/o left sided abdominal pain, left flank pain, left testicular pain since yesterday morning. Associated with nausea and vomiting. Pain is constant, sharp. Patient was evaluated in ED on May 17th with similar symptoms, patient had CT which showed a 2 mm L UVJ stones with mild hydro and epididymitis. Patient was d/adrian with levaquin. Patient hasn't followed up with urologist yet. Denies fever, chills, chest pain, sob, diarrhea, urinary sxs, flank pain, hematuria.

## 2019-06-11 NOTE — ED PROVIDER NOTE - NS ED ROS FT
Constitutional: no fever, chills   Cardiovascular: no chest pain, no sob  Respiratory: no cough, no shortness of breath,  Gastrointestinal: see HPI. no rectal bleeding. normal BMs.   : + left flank pain. no hematuria. no dysuria or increased frequency.   Integumentary: no rash or skin changes. no edema  Neurological: no headache, no dizziness, no visual changes, no UE/LE weakness or paresthesias.

## 2019-06-11 NOTE — ED ADULT NURSE NOTE - NSFALLRSKASSESASSIST_ED_ALL_ED
EXAM DESCRIPTION:  U/S ABDOMEN LIMITED W/O DOP



COMPLETED DATE/TIME:  4/23/2018 8:40 am



REASON FOR STUDY:  ABN LFT (R94.5) R94.5  ABNORMAL RESULTS OF LIVER FUNCTION STUDIES



COMPARISON:  None.



TECHNIQUE:  Dynamic and static grayscale images acquired of the abdomen and recorded on PACS. Additio
nal selected color Doppler and spectral images recorded.



LIMITATIONS:  None.



FINDINGS:  PANCREAS: No masses.  Visualized pancreatic duct normal caliber.

LIVER:  The liver measures 15.1 cm in length, normal. No masses. Echotexture normal.

LIVER VASCULATURE: Normal directional flow of the main portal vein and hepatic veins.

GALLBLADDER:  Prior cholecystectomy.

ULTRASOUND-DETECTED MOSCOSO'S SIGN: Negative.

INTRAHEPATIC DUCTS AND COMMON DUCT: CBD measures 6.3 mm in diameter, within normal limits for postcho
lecystectomy patient.  Theintrahepatic ducts normal caliber. No filling defects.

INFERIOR VENA CAVA: Normal flow.

AORTA: No aneurysm.

RIGHT KIDNEY:  The right kidney measures 8.5 cm in length, size appears to be underestimated probably
 due to technique.  Renal ultrasound examination if clinically indicated.  Normal echogenicity. No so
lid or suspicious masses. No hydronephrosis. No calcifications.

PERITONEAL AND RIGHT PLEURAL SPACE: No ascites or effusions.

OTHER: No other significant findings.



IMPRESSION:  1 Prior cholecystectomy.

2.  Please see above report.



TECHNICAL DOCUMENTATION:  JOB ID:  [id number]

 2011 [employer]- All Rights Reserved



Reading location - IP/workstation name: [email or web address]

## 2019-06-11 NOTE — ED PROVIDER NOTE - PHYSICAL EXAMINATION
GENERAL:  well appearing, non-toxic male in no acute distress  SKIN: skin warm, pink and dry. MMM. no rash to abdomen or flank.   PULM: CTAB. Normal respiratory effort. No respiratory distress. No wheezes, stridor, rales or rhonchi. No retractions  CV: RRR, no M/R/G.   ABD: Soft,  non-distended. + LLQ abd tenderness. + left CVA tenderness. No rebound or guarding.  : + left testicular and groin tenderness, no swelling, erythema of left testicle. Chaperone: Dr. Klerman.   MSK: moving all extremities. No edema, erythema, cyanosis. Distal pulses intact.  NEURO: A+Ox3, no sensory/motor deficits, CN II-XII intact.   PSYCH: appropriate behavior, cooperative.

## 2019-06-11 NOTE — ED PROVIDER NOTE - CLINICAL SUMMARY MEDICAL DECISION MAKING FREE TEXT BOX
pt  pw  left  groin pain  -  left testicular pain since yesterday morning. seen for same    on May 17th with similar symptoms, patient had CT which showed a 2 mm L UVJ stones with mild hydro and epididymitis. Patient was d/adrian with levaquin. Patient hasn't followed up with urologist yet.  CT and US repeated in ED  : 2x2  stone,   no uti   no bacteriuria,  us  with  epidydmal  cyst and   varicole -  Pt  and fmaily want to go home -  Requesting pain medications at home -  willdc  with printout ofresults -   follow up their urologist Dr savage - return to ed instructions discussed  Patient to be discharged from ED. Any available test results were discussed with and printed  for patient.  Verbal instructions given, including instructions to return to ED immediately for any new, worsening, or concerning symptoms. Patient endorsed understanding. Written discharge instructions additionally given, including follow-up plan.

## 2019-06-12 PROBLEM — I63.9 CEREBRAL INFARCTION, UNSPECIFIED: Chronic | Status: ACTIVE | Noted: 2019-05-17

## 2019-06-12 PROBLEM — F03.90 UNSPECIFIED DEMENTIA WITHOUT BEHAVIORAL DISTURBANCE: Chronic | Status: ACTIVE | Noted: 2019-05-17

## 2019-06-12 LAB
CULTURE RESULTS: SIGNIFICANT CHANGE UP
SPECIMEN SOURCE: SIGNIFICANT CHANGE UP

## 2019-07-12 ENCOUNTER — OUTPATIENT (OUTPATIENT)
Dept: OUTPATIENT SERVICES | Facility: HOSPITAL | Age: 79
LOS: 1 days | Discharge: HOME | End: 2019-07-12

## 2019-07-12 DIAGNOSIS — Z98.890 OTHER SPECIFIED POSTPROCEDURAL STATES: Chronic | ICD-10-CM

## 2019-07-12 DIAGNOSIS — D64.9 ANEMIA, UNSPECIFIED: ICD-10-CM

## 2019-07-12 DIAGNOSIS — R94.5 ABNORMAL RESULTS OF LIVER FUNCTION STUDIES: ICD-10-CM

## 2019-10-16 ENCOUNTER — OUTPATIENT (OUTPATIENT)
Dept: OUTPATIENT SERVICES | Facility: HOSPITAL | Age: 79
LOS: 1 days | Discharge: HOME | End: 2019-10-16

## 2019-10-16 DIAGNOSIS — D64.9 ANEMIA, UNSPECIFIED: ICD-10-CM

## 2019-10-16 DIAGNOSIS — I10 ESSENTIAL (PRIMARY) HYPERTENSION: ICD-10-CM

## 2019-10-16 DIAGNOSIS — R73.02 IMPAIRED GLUCOSE TOLERANCE (ORAL): ICD-10-CM

## 2019-10-16 DIAGNOSIS — Z98.890 OTHER SPECIFIED POSTPROCEDURAL STATES: Chronic | ICD-10-CM

## 2019-10-16 DIAGNOSIS — E78.2 MIXED HYPERLIPIDEMIA: ICD-10-CM

## 2019-11-04 ENCOUNTER — EMERGENCY (EMERGENCY)
Facility: HOSPITAL | Age: 79
LOS: 0 days | Discharge: HOME | End: 2019-11-04
Attending: EMERGENCY MEDICINE | Admitting: EMERGENCY MEDICINE
Payer: MEDICARE

## 2019-11-04 VITALS
TEMPERATURE: 97 F | OXYGEN SATURATION: 96 % | WEIGHT: 188.94 LBS | DIASTOLIC BLOOD PRESSURE: 68 MMHG | HEART RATE: 45 BPM | SYSTOLIC BLOOD PRESSURE: 148 MMHG | RESPIRATION RATE: 18 BRPM | HEIGHT: 69 IN

## 2019-11-04 VITALS
HEART RATE: 45 BPM | SYSTOLIC BLOOD PRESSURE: 145 MMHG | DIASTOLIC BLOOD PRESSURE: 57 MMHG | TEMPERATURE: 96 F | OXYGEN SATURATION: 95 % | RESPIRATION RATE: 18 BRPM

## 2019-11-04 DIAGNOSIS — Z98.890 OTHER SPECIFIED POSTPROCEDURAL STATES: ICD-10-CM

## 2019-11-04 DIAGNOSIS — Z98.890 OTHER SPECIFIED POSTPROCEDURAL STATES: Chronic | ICD-10-CM

## 2019-11-04 DIAGNOSIS — R42 DIZZINESS AND GIDDINESS: ICD-10-CM

## 2019-11-04 DIAGNOSIS — R00.1 BRADYCARDIA, UNSPECIFIED: ICD-10-CM

## 2019-11-04 LAB
ALBUMIN SERPL ELPH-MCNC: 4.4 G/DL — SIGNIFICANT CHANGE UP (ref 3.5–5.2)
ALP SERPL-CCNC: 92 U/L — SIGNIFICANT CHANGE UP (ref 30–115)
ALT FLD-CCNC: 31 U/L — SIGNIFICANT CHANGE UP (ref 0–41)
ANION GAP SERPL CALC-SCNC: 12 MMOL/L — SIGNIFICANT CHANGE UP (ref 7–14)
AST SERPL-CCNC: 36 U/L — SIGNIFICANT CHANGE UP (ref 0–41)
BILIRUB SERPL-MCNC: 0.7 MG/DL — SIGNIFICANT CHANGE UP (ref 0.2–1.2)
BUN SERPL-MCNC: 21 MG/DL — HIGH (ref 10–20)
CALCIUM SERPL-MCNC: 9.8 MG/DL — SIGNIFICANT CHANGE UP (ref 8.5–10.1)
CHLORIDE SERPL-SCNC: 100 MMOL/L — SIGNIFICANT CHANGE UP (ref 98–110)
CO2 SERPL-SCNC: 28 MMOL/L — SIGNIFICANT CHANGE UP (ref 17–32)
CREAT SERPL-MCNC: 0.9 MG/DL — SIGNIFICANT CHANGE UP (ref 0.7–1.5)
GLUCOSE SERPL-MCNC: 86 MG/DL — SIGNIFICANT CHANGE UP (ref 70–99)
HCT VFR BLD CALC: 39.7 % — LOW (ref 42–52)
HGB BLD-MCNC: 13 G/DL — LOW (ref 14–18)
MCHC RBC-ENTMCNC: 29.6 PG — SIGNIFICANT CHANGE UP (ref 27–31)
MCHC RBC-ENTMCNC: 32.7 G/DL — SIGNIFICANT CHANGE UP (ref 32–37)
MCV RBC AUTO: 90.4 FL — SIGNIFICANT CHANGE UP (ref 80–94)
NRBC # BLD: 0 /100 WBCS — SIGNIFICANT CHANGE UP (ref 0–0)
NT-PROBNP SERPL-SCNC: 288 PG/ML — SIGNIFICANT CHANGE UP (ref 0–300)
PLATELET # BLD AUTO: 224 K/UL — SIGNIFICANT CHANGE UP (ref 130–400)
POTASSIUM SERPL-MCNC: 5 MMOL/L — SIGNIFICANT CHANGE UP (ref 3.5–5)
POTASSIUM SERPL-SCNC: 5 MMOL/L — SIGNIFICANT CHANGE UP (ref 3.5–5)
PROT SERPL-MCNC: 6.9 G/DL — SIGNIFICANT CHANGE UP (ref 6–8)
RBC # BLD: 4.39 M/UL — LOW (ref 4.7–6.1)
RBC # FLD: 13.9 % — SIGNIFICANT CHANGE UP (ref 11.5–14.5)
SODIUM SERPL-SCNC: 140 MMOL/L — SIGNIFICANT CHANGE UP (ref 135–146)
TROPONIN T SERPL-MCNC: <0.01 NG/ML — SIGNIFICANT CHANGE UP
WBC # BLD: 5.76 K/UL — SIGNIFICANT CHANGE UP (ref 4.8–10.8)
WBC # FLD AUTO: 5.76 K/UL — SIGNIFICANT CHANGE UP (ref 4.8–10.8)

## 2019-11-04 PROCEDURE — 99285 EMERGENCY DEPT VISIT HI MDM: CPT

## 2019-11-04 PROCEDURE — 71046 X-RAY EXAM CHEST 2 VIEWS: CPT | Mod: 26

## 2019-11-04 RX ORDER — DONEPEZIL HYDROCHLORIDE 10 MG/1
1 TABLET, FILM COATED ORAL
Qty: 0 | Refills: 0 | DISCHARGE

## 2019-11-04 RX ORDER — AMLODIPINE BESYLATE 2.5 MG/1
1 TABLET ORAL
Qty: 0 | Refills: 0 | DISCHARGE

## 2019-11-04 RX ORDER — CELECOXIB 200 MG/1
1 CAPSULE ORAL
Qty: 0 | Refills: 0 | DISCHARGE

## 2019-11-04 RX ORDER — GABAPENTIN 400 MG/1
1 CAPSULE ORAL
Qty: 0 | Refills: 0 | DISCHARGE

## 2019-11-04 NOTE — ED PROVIDER NOTE - NS ED ROS FT
Constitutional: (-) fever  Eyes/ENT: (-) blurry vision, (-) epistaxis, (-) visual changes   Cardiovascular: (-) chest pain, (-) syncope  Respiratory: (-) cough, (-) shortness of breath  Gastrointestinal: (-) vomiting, (-) diarrhea  Genitourinary: (-) dysuria, (-) hesitancy, (-) frequency   Musculoskeletal: (-) neck pain, (-) back pain, (-) joint pain  Integumentary: (-) rash, (-) edema  Neurological: (-) headache, (-) altered mental status, dizziness   Allergic/Immunologic: (-) pruritus

## 2019-11-04 NOTE — ED PROVIDER NOTE - ATTENDING CONTRIBUTION TO CARE
79 year old male, pmhx of cva dementia, + known bradycardia with a known HR that goes down as low as 38 as per patient and his sister bedside who is a reliable historian, patient was here for evaluation of dizziness that ocurred while straining at physical therapy, symptoms subsided prior to arrival, no cp/sob, no n/v/d, no loc, no fever    CONSTITUTIONAL: Well-developed; well-nourished; in no acute distress. Sitting up and providing appropriate history and physical examination  SKIN: skin exam is warm and dry, no acute rash.  HEAD: Normocephalic; atraumatic.  EYES: PERRL, 3 mm bilateral, no nystagmus, EOM intact; conjunctiva and sclera clear.  ENT: No nasal discharge; airway clear.  NECK: Supple; non tender. + full passive ROM in all directions. No JVD  CARD: S1, S2 normal; no murmurs, gallops, or rubs. Regular rate and rhythm. + Symmetric Strong Pulses  RESP: No wheezes, rales or rhonchi. Good air movement bilaterally  ABD: soft; non-distended; non-tender. No Rebound, No Guarding, No signs of peritonitis, No CVA tenderness. No pulsatile abdominal mass. + Strong and Symmetric Pulses  EXT: Normal ROM. No clubbing, cyanosis or edema. Dp and Pt Pulses intact. Cap refill less than 3 seconds  NEURO: CN 2-12 intact, normal finger to nose, normal romberg, stable gait, no sensory or motor deficits, Alert, oriented, grossly unremarkable. No Focal deficits. GCS 15. NIH 0  PSYCH: Cooperative, appropriate.

## 2019-11-04 NOTE — ED PROVIDER NOTE - OBJECTIVE STATEMENT
80 yo male with a pmh of bradycardia, HTN, HLD, and CVA present c/o dizziness. pt was performing band exercises at his day facility and felt like he was going to pass out. this lasted for several minutes before subsiding and denies any spinning sensation. he denies any other symptoms including fevers, chill, headache, recent illness/travel, cough, abdominal pain, chest pain, or SOB

## 2019-11-04 NOTE — ED PROVIDER NOTE - CARE PROVIDER_API CALL
Kevin Caraballo)  Cardiovascular Disease; Interventional Cardiology  [address] [place] [address]  Phone: [phone]  Fax: [phone]  Follow Up Time: 1-3 Days

## 2019-11-04 NOTE — ED PROVIDER NOTE - CLINICAL SUMMARY MEDICAL DECISION MAKING FREE TEXT BOX
I personally evaluated the patient. I reviewed the Resident’s or Physician Assistant’s note (as assigned above), and agree with the findings and plan except as documented in my note., Patient case discussed with cardiologist Dr. lindsay, patient can follow tomorrow in office, no cp/sob, no n/v/d, normal neuro exam, asymptomatic in the ED. I have fully discussed the medical management and delivery of care with the patient. I have discussed any available labs, imaging and treatment options with the patient. Patient confirms understanding and has been given detailed return precautions. Patient instructed to return to the ED should symptoms persist or worsen. Patient has demonstrated capacity and has verbalized understanding. Patient is well appearing upon discharge.

## 2019-11-04 NOTE — ED PROVIDER NOTE - NSFOLLOWUPINSTRUCTIONS_ED_ALL_ED_FT
Please follow up with your primary care physician within 24-72 hours and return immediately if symptoms worsen.    Dizziness  Dizziness is a common problem. It makes you feel unsteady or light-headed. You may feel like you are about to pass out (faint). Dizziness can lead to getting hurt if you stumble or fall. Dizziness can be caused by many things, including:  Medicines.  Not having enough water in your body (dehydration).  Illness.  Follow these instructions at home:  Eating and drinking     Image   Drink enough fluid to keep your pee (urine) clear or pale yellow. This helps to keep you from getting dehydrated. Try to drink more clear fluids, such as water.  Do not drink alcohol.  Limit how much caffeine you drink or eat, if your doctor tells you to do that.  Limit how much salt (sodium) you drink or eat, if your doctor tells you to do that.  Activity     Image   Avoid making quick movements.  When you stand up from sitting in a chair, steady yourself until you feel okay.  In the morning, first sit up on the side of the bed. When you feel okay, stand slowly while you hold onto something. Do this until you know that your balance is fine.  If you need to  one place for a long time, move your legs often. Tighten and relax the muscles in your legs while you are standing.  Do not drive or use heavy machinery if you feel dizzy.  Avoid bending down if you feel dizzy. Place items in your home so you can reach them easily without leaning over.  Lifestyle     Do not use any products that contain nicotine or tobacco, such as cigarettes and e-cigarettes. If you need help quitting, ask your doctor.  Try to lower your stress level. You can do this by using methods such as yoga or meditation. Talk with your doctor if you need help.  General instructions     Watch your dizziness for any changes.  Take over-the-counter and prescription medicines only as told by your doctor. Talk with your doctor if you think that you are dizzy because of a medicine that you are taking.  Tell a friend or a family member that you are feeling dizzy. If he or she notices any changes in your behavior, have this person call your doctor.  Keep all follow-up visits as told by your doctor. This is important.  Contact a doctor if:  Your dizziness does not go away.  Your dizziness or light-headedness gets worse.  You feel sick to your stomach (nauseous).  You have trouble hearing.  You have new symptoms.  You are unsteady on your feet.  You feel like the room is spinning.  Get help right away if:  You throw up (vomit) or have watery poop (diarrhea), and you cannot eat or drink anything.  You have trouble:  Talking.  Walking.  Swallowing.  Using your arms, hands, or legs.  You feel generally weak.  You are not thinking clearly, or you have trouble forming sentences. A friend or family member may notice this.  You have:  Chest pain.  Pain in your belly (abdomen).  Shortness of breath.  Sweating.  Your vision changes.  You are bleeding.  You have a very bad headache.  You have neck pain or a stiff neck.  You have a fever.  These symptoms may be an emergency. Do not wait to see if the symptoms will go away. Get medical help right away. Call your local emergency services (911 in the [place] Do not drive yourself to the hospital.     Summary  Dizziness makes you feel unsteady or light-headed. You may feel like you are about to pass out (faint).  Drink enough fluid to keep your pee (urine) clear or pale yellow. Do not drink alcohol.  Avoid making quick movements if you feel dizzy.  Watch your dizziness for any changes.  This information is not intended to replace advice given to you by your health care provider. Make sure you discuss any questions you have with your health care provider.

## 2019-11-04 NOTE — ED PROVIDER NOTE - PATIENT PORTAL LINK FT
You can access the FollowMyHealth Patient Portal offered by [facility] by registering at the following website: [email or web address]. By joining [employer]’s FollowMyHealth portal, you will also be able to view your health information using other applications (apps) compatible with our system.

## 2019-11-04 NOTE — ED ADULT NURSE NOTE - NSIMPLEMENTINTERV_GEN_ALL_ED
Implemented All Fall with Harm Risk Interventions:  [place] to call system. Call bell, personal items and telephone within reach. Instruct patient to call for assistance. Room bathroom lighting operational. Non-slip footwear when patient is off stretcher. Physically safe environment: no spills, clutter or unnecessary equipment. Stretcher in lowest position, wheels locked, appropriate side rails in place. Provide visual cue, wrist band, yellow gown, etc. Monitor gait and stability. Monitor for mental status changes and reorient to person, place, and time. Review medications for side effects contributing to fall risk. Reinforce activity limits and safety measures with patient and family. Provide visual clues: red socks.

## 2019-11-04 NOTE — ED PROVIDER NOTE - PHYSICAL EXAMINATION
Gen: NAD, AOx3  Head: NCAT  HEENT: PERRL, oral mucosa moist, normal conjunctiva, oropharynx clear without exudate or erythema  Lung: CTAB, no respiratory distress, no wheezing, rales, rhonchi  CV: normal s1/s2, rrr, Normal perfusion, pulses 2+ throughout  Abd: soft, NTND, no CVA tenderness  Genitourinary: no pelvic tenderness  MSK: No edema, no visible deformities, full range of motion in all 4 extremities  Neuro: CN II-XII grossly intact, No focal neurologic deficits, no nystagmus/pronator drift, coordination/sensation intact, strength 5/5 BUE/BLE, steady gait   Skin: No rash   Psych: normal affect

## 2021-06-03 NOTE — ED PROVIDER NOTE - NS ED MD DISPO DISCHARGE CCDA
Raymundo Tafoya is a 29 y.o. female (: 1988) presenting to address:    Chief Complaint   Patient presents with    Urinary Burning       Vitals:    21 1304   BP: 108/70   Pulse: 76   Resp: 15   Temp: 98 °F (36.7 °C)   TempSrc: Temporal   SpO2: 97%   Weight: 273 lb 9.6 oz (124.1 kg)   Height: 5' 9\" (1.753 m)   PainSc:   0 - No pain   LMP: 2021       Hearing/Vision:   No exam data present    Learning Assessment:     Learning Assessment 8/15/2019   PRIMARY LEARNER Patient   HIGHEST LEVEL OF EDUCATION - PRIMARY LEARNER  2 YEARS OF COLLEGE   BARRIERS PRIMARY LEARNER -   CO-LEARNER CAREGIVER No   PRIMARY LANGUAGE ENGLISH    NEED No   LEARNER PREFERENCE PRIMARY DEMONSTRATION   ANSWERED BY patient   RELATIONSHIP SELF     Depression Screening:     3 most recent PHQ Screens 10/29/2014   PHQ Not Done Active Diagnosis of Depression or Bipolar Disorder   Little interest or pleasure in doing things Not at all   Feeling down, depressed, irritable, or hopeless Several days   Total Score PHQ 2 1     Fall Risk Assessment:     Fall Risk Assessment, last 12 mths 8/15/2019   Able to walk? Yes   Fall in past 12 months? No     Abuse Screening:     Abuse Screening Questionnaire 8/15/2019   Do you ever feel afraid of your partner? N   Are you in a relationship with someone who physically or mentally threatens you? N   Is it safe for you to go home? Y     Coordination of Care Questionaire:   1. Have you been to the ER, urgent care clinic since your last visit? Hospitalized since your last visit? NO    2. Have you seen or consulted any other health care providers outside of the [address] [place] [address] since your last visit? Include any pap smears or colon screening. NO    Advanced Directive:   1. Do you have an Advanced Directive? NO    2. Would you like information on Advanced Directives?  NO Patient/Caregiver provided printed discharge information.

## 2022-04-06 PROBLEM — Z00.00 ENCOUNTER FOR PREVENTIVE HEALTH EXAMINATION: Status: ACTIVE | Noted: 2022-04-06

## 2022-04-08 ENCOUNTER — OUTPATIENT (OUTPATIENT)
Dept: OUTPATIENT SERVICES | Facility: HOSPITAL | Age: 82
LOS: 1 days | Discharge: HOME | End: 2022-04-08
Payer: MEDICARE

## 2022-04-08 DIAGNOSIS — R35.1 NOCTURIA: ICD-10-CM

## 2022-04-08 DIAGNOSIS — Z98.890 OTHER SPECIFIED POSTPROCEDURAL STATES: Chronic | ICD-10-CM

## 2022-04-08 DIAGNOSIS — Z87.442 PERSONAL HISTORY OF URINARY CALCULI: ICD-10-CM

## 2022-04-08 DIAGNOSIS — R39.15 URGENCY OF URINATION: ICD-10-CM

## 2022-04-08 PROCEDURE — 76775 US EXAM ABDO BACK WALL LIM: CPT | Mod: 26

## 2022-06-28 ENCOUNTER — APPOINTMENT (OUTPATIENT)
Dept: NEUROLOGY | Facility: CLINIC | Age: 82
End: 2022-06-28

## 2022-08-15 ENCOUNTER — RX RENEWAL (OUTPATIENT)
Age: 82
End: 2022-08-15

## 2022-08-23 ENCOUNTER — APPOINTMENT (OUTPATIENT)
Dept: NEUROLOGY | Facility: CLINIC | Age: 82
End: 2022-08-23

## 2022-08-23 VITALS
DIASTOLIC BLOOD PRESSURE: 79 MMHG | SYSTOLIC BLOOD PRESSURE: 159 MMHG | HEIGHT: 69 IN | BODY MASS INDEX: 28.14 KG/M2 | HEART RATE: 54 BPM | WEIGHT: 190 LBS

## 2022-08-23 PROCEDURE — 99214 OFFICE O/P EST MOD 30 MIN: CPT

## 2022-08-23 NOTE — ASSESSMENT
[FreeTextEntry1] : 82 year old male stable with respect to his dementia and CVA. We will continue to prescribe Aricept 10 mg daily and add back Namenda but at 10 mg bid.  I will temporarily prescribe zoloft 50 mg until his sees his pcp next month and he will f/u in 4 months for re evaluation. They are aware if there are any issues he can contact the office.\par \par I personally reviewed with the PA, this patient's history and physical exam findings, as documented above. I have discussed the relevant areas of concern, having direct implications to the presenting problems and illnesses, and I have personally examined all pertinent and positive and negative findings, which impact on the prior neurological treatment. \par \par \par Paty Coe, MS, PA-C\par Jose Eduardo Myers, \par

## 2022-08-23 NOTE — PHYSICAL EXAM
[General Appearance - Alert] : alert [General Appearance - In No Acute Distress] : in no acute distress [General Appearance - Well Nourished] : well nourished [General Appearance - Well Developed] : well developed [General Appearance - Well-Appearing] : healthy appearing [] : normal voice and communication [Affect] : the affect was normal [Memory Remote] : remote memory was not impaired [Cranial Nerves Optic (II)] : visual acuity intact bilaterally,  visual fields full to confrontation, pupils equal round and reactive to light [Cranial Nerves Oculomotor (III)] : extraocular motion intact [Cranial Nerves Facial (VII)] : face symmetrical [Cranial Nerves Accessory (XI - Cranial And Spinal)] : head turning and shoulder shrug symmetric [Involuntary Movements] : no involuntary movements were seen [Person] : disoriented to person [Place] : disoriented to place [Time] : disoriented to time [Short Term Intact] : short term memory impaired [Remote Intact] : remote memory impaired [Registration Intact] : recent registration memory impaired

## 2022-08-23 NOTE — REVIEW OF SYSTEMS
[Anxiety] : anxiety [Depression] : depression [Confused or Disoriented] : confusion [Memory Lapses or Loss] : memory loss [Arm Weakness] : arm weakness [Hand Weakness] :  hand weakness [Numbness] : numbness [Joint Pain] : joint pain [Negative] : Respiratory

## 2022-08-23 NOTE — HISTORY OF PRESENT ILLNESS
[FreeTextEntry1] : He is a very pleasant 82-year-old right-handed Khmer-speaking man who is accompanied by his wife and daughter today for follow up regards to a recent stroke. His history is obtained from his daughter who helped with translation. The patient has a history of hypertension, sick sinus syndrome woke up with some chest tightness and right-sided weakness on May 27, 2018. He was on the phone with his brother who noted that his speech also became incoherent or slurring. He was subsequently, evaluated in the hospital. CAT scan of the head initially was negative. Subsequently, he had MRI of the brain on May 28th, which show a small sub centimeter acute lacunar infarct involving the left thalamus. There is also paraventricular subcortical white matter disease. The patient complains of having right-sided numbness involving the face, arm, and leg. However, the numbness in his right leg seems to have subsided. He also has some difficulty walking due to the numbness. He denies significant weakness. He did have home PT for the last few weeks, which has just finished. The patient still complained of having the numbness that is uncomfortable. He also complains of having tinnitus. Denies any hearing loss.\par \par TODAY: I had the pleasure of speaking with Mr. Miles accompanied by his daughter today in follow up. His previous history and physical findings have been reviewed. \par \par He is under our care for subacute lacunar infarct as well as dementia which are chronic conditions he is receiving continued active treatment for. His daughter states unfortunately his anxiety and depression as well as his memory has worsened since our last visit.  With respect to his dementia he is managed on a regimen of Aricept 10 mg and stopped the namenda xr 28 mg months ago as it was making him nauseous.  He was tolerating the 10 mg bid without issue and we have recommended he go back on this dosage. With respect to his depression and anxiety he is no longer under the care of a  Geriatric psychiatrist as the doctor relocated and cut their hours making it difficult for them to see him.  He was taking zoloft 150 mg qhs which was helping but has not had it in at least 4 months.  We discussed placing him back on it at a lower dosage until he sees his PCP in the next 3-4 weeks.

## 2022-10-12 ENCOUNTER — APPOINTMENT (OUTPATIENT)
Dept: NEUROLOGY | Facility: CLINIC | Age: 82
End: 2022-10-12

## 2022-10-12 VITALS — HEART RATE: 74 BPM | SYSTOLIC BLOOD PRESSURE: 142 MMHG | DIASTOLIC BLOOD PRESSURE: 77 MMHG

## 2022-10-12 PROCEDURE — 99213 OFFICE O/P EST LOW 20 MIN: CPT

## 2022-10-12 RX ORDER — DONEPEZIL HYDROCHLORIDE 10 MG/1
10 TABLET ORAL DAILY
Qty: 90 | Refills: 3 | Status: ACTIVE | COMMUNITY
Start: 2022-08-23 | End: 1900-01-01

## 2022-10-12 NOTE — ASSESSMENT
[FreeTextEntry1] : dementia and past cva-condition stable, medication refilled. can follow up in 6-12 months barring any new changes\par Jose Eduardo Myers, \par

## 2022-10-12 NOTE — HISTORY OF PRESENT ILLNESS
[FreeTextEntry1] : Mr. MICHELLE GÓMEZ returns to the office for follow-up and his prior history and physical have been reviewed and he reports no change since last visit.  he is accompanied by his wift and daughter but daughter gave the history.  he has been seeing us for dementia which has been stable since last visit and just needs aricept refills.  he had tried namenda in the past but could not tolerate the nausea, so had to stop.  he also has been depressed but the next appointment with a psychitrist isn't for another couple of months.  he is current taking zolft which works.

## 2022-11-17 ENCOUNTER — RX RENEWAL (OUTPATIENT)
Age: 82
End: 2022-11-17

## 2022-12-24 NOTE — DISCHARGE NOTE ADULT - NS DC INTERPRETER YES NO FREE
[facility] contacted this RN to notify that pt has been accepted to a bed at their facility. SW and Charge RN to be notified   No

## 2023-03-27 ENCOUNTER — RX RENEWAL (OUTPATIENT)
Age: 83
End: 2023-03-27

## 2023-04-19 ENCOUNTER — APPOINTMENT (OUTPATIENT)
Dept: OTOLARYNGOLOGY | Facility: CLINIC | Age: 83
End: 2023-04-19
Payer: MEDICARE

## 2023-04-19 VITALS — HEIGHT: 69 IN | WEIGHT: 210 LBS | BODY MASS INDEX: 31.1 KG/M2

## 2023-04-19 DIAGNOSIS — T16.1XXA FOREIGN BODY IN RIGHT EAR, INITIAL ENCOUNTER: ICD-10-CM

## 2023-04-19 PROCEDURE — 69200 CLEAR OUTER EAR CANAL: CPT | Mod: RT

## 2023-04-19 PROCEDURE — 99203 OFFICE O/P NEW LOW 30 MIN: CPT | Mod: 25

## 2023-04-19 NOTE — HISTORY OF PRESENT ILLNESS
[de-identified] : PAtient  presents today c/o  clogged ear.   He has  b/l hearing aids ,  doesn't  wear them .  No  recent audiogram.   Right ear  tinnitus  .

## 2023-04-24 ENCOUNTER — RX RENEWAL (OUTPATIENT)
Age: 83
End: 2023-04-24

## 2023-05-10 ENCOUNTER — APPOINTMENT (OUTPATIENT)
Dept: OTOLARYNGOLOGY | Facility: CLINIC | Age: 83
End: 2023-05-10
Payer: MEDICARE

## 2023-05-10 DIAGNOSIS — H90.5 UNSPECIFIED SENSORINEURAL HEARING LOSS: ICD-10-CM

## 2023-05-10 DIAGNOSIS — H93.8X3 OTHER SPECIFIED DISORDERS OF EAR, BILATERAL: ICD-10-CM

## 2023-05-10 PROCEDURE — 92557 COMPREHENSIVE HEARING TEST: CPT

## 2023-05-10 PROCEDURE — 99213 OFFICE O/P EST LOW 20 MIN: CPT

## 2023-05-10 PROCEDURE — 92567 TYMPANOMETRY: CPT

## 2023-05-10 NOTE — HISTORY OF PRESENT ILLNESS
[FreeTextEntry1] : Patient presents today c/o  clogged ears ,  accompanied by his  daughter .  Here for  audiogram .   Dnies any ear discomfort .

## 2023-05-11 ENCOUNTER — APPOINTMENT (OUTPATIENT)
Dept: NEUROLOGY | Facility: CLINIC | Age: 83
End: 2023-05-11
Payer: MEDICARE

## 2023-05-11 VITALS — BODY MASS INDEX: 31.84 KG/M2 | HEIGHT: 69 IN | WEIGHT: 215 LBS

## 2023-05-11 PROCEDURE — 99213 OFFICE O/P EST LOW 20 MIN: CPT

## 2023-05-12 NOTE — HISTORY OF PRESENT ILLNESS
[FreeTextEntry1] : \par \par Mr. MICHELLE GÓMEZ returns to the office for follow-up and his prior history and physical have been reviewed and he reports no change since last visit.  he is accompanied by his wift and daughter but daughter gave the history.  he has been seeing us for dementia which has been stable since last visit and just needs aricept refills.  he had tried namenda in the past but could not tolerate the nausea, so had to stop\par \par today wife reports that she has noticed his gait to be unsteady at times, but has not had any falls.  patient declined go to for therapy for gait prevention.

## 2023-06-06 ENCOUNTER — OUTPATIENT (OUTPATIENT)
Dept: OUTPATIENT SERVICES | Facility: HOSPITAL | Age: 83
LOS: 1 days | End: 2023-06-06
Payer: COMMERCIAL

## 2023-06-06 DIAGNOSIS — Z01.20 ENCOUNTER FOR DENTAL EXAMINATION AND CLEANING WITHOUT ABNORMAL FINDINGS: ICD-10-CM

## 2023-06-06 DIAGNOSIS — Z98.890 OTHER SPECIFIED POSTPROCEDURAL STATES: Chronic | ICD-10-CM

## 2023-06-06 PROCEDURE — D0120: CPT

## 2023-06-06 PROCEDURE — D1110: CPT

## 2023-06-06 PROCEDURE — D0230: CPT

## 2023-06-06 PROCEDURE — D0330: CPT

## 2023-06-07 DIAGNOSIS — Z01.21 ENCOUNTER FOR DENTAL EXAMINATION AND CLEANING WITH ABNORMAL FINDINGS: ICD-10-CM

## 2023-06-12 ENCOUNTER — RX RENEWAL (OUTPATIENT)
Age: 83
End: 2023-06-12

## 2023-07-13 ENCOUNTER — APPOINTMENT (OUTPATIENT)
Dept: OTOLARYNGOLOGY | Facility: CLINIC | Age: 83
End: 2023-07-13
Payer: MEDICARE

## 2023-07-13 PROCEDURE — V5299A: CUSTOM

## 2023-07-25 ENCOUNTER — RX RENEWAL (OUTPATIENT)
Age: 83
End: 2023-07-25

## 2023-08-01 ENCOUNTER — APPOINTMENT (OUTPATIENT)
Dept: OTOLARYNGOLOGY | Facility: CLINIC | Age: 83
End: 2023-08-01
Payer: MEDICARE

## 2023-08-01 PROCEDURE — V5261E: CUSTOM

## 2023-09-06 ENCOUNTER — APPOINTMENT (OUTPATIENT)
Dept: OTOLARYNGOLOGY | Facility: CLINIC | Age: 83
End: 2023-09-06
Payer: COMMERCIAL

## 2023-09-06 PROCEDURE — V5299A: CUSTOM

## 2023-09-08 NOTE — ED ADULT TRIAGE NOTE - HEIGHT IN INCHES
9 Staged Advancement Flap Text: The defect edges were debeveled with a #15 scalpel blade. Given the location of the defect, shape of the defect and the proximity to free margins a staged advancement flap was deemed most appropriate. Using a sterile surgical marker, an appropriate advancement flap was drawn incorporating the defect and placing the expected incisions within the relaxed skin tension lines where possible. The area thus outlined was incised deep to adipose tissue with a #15 scalpel blade. The skin margins were undermined to an appropriate distance in all directions utilizing iris scissors. Following this, the designed flap was carried over into the primary defect and sutured into place.

## 2023-09-20 ENCOUNTER — RX RENEWAL (OUTPATIENT)
Age: 83
End: 2023-09-20

## 2023-11-15 ENCOUNTER — APPOINTMENT (OUTPATIENT)
Dept: NEUROLOGY | Facility: CLINIC | Age: 83
End: 2023-11-15

## 2023-11-17 ENCOUNTER — APPOINTMENT (OUTPATIENT)
Dept: NEUROLOGY | Facility: CLINIC | Age: 83
End: 2023-11-17
Payer: MEDICARE

## 2023-11-17 VITALS
HEART RATE: 64 BPM | HEIGHT: 69 IN | BODY MASS INDEX: 31.84 KG/M2 | DIASTOLIC BLOOD PRESSURE: 79 MMHG | SYSTOLIC BLOOD PRESSURE: 152 MMHG | WEIGHT: 215 LBS

## 2023-11-17 DIAGNOSIS — F41.9 ANXIETY DISORDER, UNSPECIFIED: ICD-10-CM

## 2023-11-17 DIAGNOSIS — F32.A ANXIETY DISORDER, UNSPECIFIED: ICD-10-CM

## 2023-11-17 DIAGNOSIS — R26.81 UNSTEADINESS ON FEET: ICD-10-CM

## 2023-11-17 DIAGNOSIS — I63.81 OTHER CEREBRAL INFARCTION DUE TO OCCLUSION OR STENOSIS OF SMALL ARTERY: ICD-10-CM

## 2023-11-17 DIAGNOSIS — R41.3 OTHER AMNESIA: ICD-10-CM

## 2023-11-17 PROCEDURE — 99214 OFFICE O/P EST MOD 30 MIN: CPT

## 2023-11-17 RX ORDER — MEMANTINE HYDROCHLORIDE 28 MG/1
28 CAPSULE, EXTENDED RELEASE ORAL
Qty: 30 | Refills: 5 | Status: ACTIVE | COMMUNITY
Start: 2023-11-17 | End: 1900-01-01

## 2023-12-27 ENCOUNTER — RX RENEWAL (OUTPATIENT)
Age: 83
End: 2023-12-27

## 2023-12-28 ENCOUNTER — RX RENEWAL (OUTPATIENT)
Age: 83
End: 2023-12-28

## 2023-12-28 RX ORDER — SERTRALINE HYDROCHLORIDE 50 MG/1
50 TABLET, FILM COATED ORAL
Qty: 90 | Refills: 0 | Status: ACTIVE | COMMUNITY
Start: 2022-08-23 | End: 1900-01-01

## 2024-01-13 NOTE — ED PROVIDER NOTE - CARE PROVIDER_API CALL
Bal Archer)  Urology  [address] [place] [address], [address]  [place] [address]  Phone: [phone]  Fax: [phone]  Follow Up Time:
GOAL: Pt will improve dynamic standing balance to good in order to improve functional mobility in 4 weeks.

## 2024-03-13 ENCOUNTER — APPOINTMENT (OUTPATIENT)
Dept: OTOLARYNGOLOGY | Facility: CLINIC | Age: 84
End: 2024-03-13

## 2024-03-19 ENCOUNTER — RX RENEWAL (OUTPATIENT)
Age: 84
End: 2024-03-19

## 2024-03-19 RX ORDER — MEMANTINE HYDROCHLORIDE 10 MG/1
10 TABLET, FILM COATED ORAL
Qty: 180 | Refills: 0 | Status: ACTIVE | COMMUNITY
Start: 2022-08-23 | End: 1900-01-01

## 2024-05-03 ENCOUNTER — OUTPATIENT (OUTPATIENT)
Dept: OUTPATIENT SERVICES | Facility: HOSPITAL | Age: 84
LOS: 1 days | End: 2024-05-03
Payer: SELF-PAY

## 2024-05-03 DIAGNOSIS — Z98.890 OTHER SPECIFIED POSTPROCEDURAL STATES: Chronic | ICD-10-CM

## 2024-05-03 DIAGNOSIS — K02.9 DENTAL CARIES, UNSPECIFIED: ICD-10-CM

## 2024-05-03 PROCEDURE — D0140: CPT

## 2024-05-03 PROCEDURE — D0220: CPT

## 2024-05-03 PROCEDURE — D7140: CPT

## 2024-05-03 PROCEDURE — D0230: CPT

## 2024-05-04 DIAGNOSIS — K02.9 DENTAL CARIES, UNSPECIFIED: ICD-10-CM

## 2024-05-10 ENCOUNTER — APPOINTMENT (OUTPATIENT)
Dept: NEUROLOGY | Facility: CLINIC | Age: 84
End: 2024-05-10

## 2024-05-10 ENCOUNTER — APPOINTMENT (OUTPATIENT)
Dept: NEUROLOGY | Facility: CLINIC | Age: 84
End: 2024-05-10
Payer: MEDICARE

## 2024-05-10 VITALS
HEART RATE: 52 BPM | BODY MASS INDEX: 31.67 KG/M2 | DIASTOLIC BLOOD PRESSURE: 66 MMHG | WEIGHT: 209 LBS | SYSTOLIC BLOOD PRESSURE: 146 MMHG | HEIGHT: 68 IN

## 2024-05-10 DIAGNOSIS — F03.90 UNSPECIFIED DEMENTIA W/OUT BEHAVIORAL DISTURBANCE: ICD-10-CM

## 2024-05-10 PROCEDURE — 99213 OFFICE O/P EST LOW 20 MIN: CPT

## 2024-05-10 NOTE — HISTORY OF PRESENT ILLNESS
[FreeTextEntry1] : Mr. MICHELLE GÓMEZ returns to the office for follow-up and his prior history and physical have been reviewed and he reports no change since last visit.  he has been seeing us for advanced dementia.  According to the daughter, his condition has not change much since last visit 6 months ago.  He did have a fall since last visit and has gone for physical therapy for fall prevention and gait training however he is not using the cane that was recommended.  He is stable on Namenda.  No new complaints today

## 2024-05-10 NOTE — PHYSICAL EXAM
[Person] : disoriented to person [Place] : disoriented to place [Time] : disoriented to time [Short Term Intact] : short term memory impaired [Remote Intact] : remote memory impaired [Registration Intact] : recent registration memory impaired [Cranial Nerves Optic (II)] : visual acuity intact bilaterally,  visual fields full to confrontation, pupils equal round and reactive to light [Cranial Nerves Oculomotor (III)] : extraocular motion intact [Cranial Nerves Facial (VII)] : face symmetrical [Cranial Nerves Accessory (XI - Cranial And Spinal)] : head turning and shoulder shrug symmetric [Involuntary Movements] : no involuntary movements were seen

## 2024-11-07 ENCOUNTER — APPOINTMENT (OUTPATIENT)
Dept: NEUROLOGY | Facility: CLINIC | Age: 84
End: 2024-11-07
Payer: MEDICARE

## 2024-11-07 VITALS
DIASTOLIC BLOOD PRESSURE: 68 MMHG | WEIGHT: 209 LBS | BODY MASS INDEX: 31.67 KG/M2 | SYSTOLIC BLOOD PRESSURE: 167 MMHG | HEIGHT: 68 IN

## 2024-11-07 DIAGNOSIS — F03.90 UNSPECIFIED DEMENTIA W/OUT BEHAVIORAL DISTURBANCE: ICD-10-CM

## 2024-11-07 PROCEDURE — 99213 OFFICE O/P EST LOW 20 MIN: CPT

## 2025-06-10 NOTE — ED PROVIDER NOTE - CLINICAL SUMMARY MEDICAL DECISION MAKING FREE TEXT BOX
Depo-provera injection administered without difficutly. Pt instructed to sit in waiting room for 15 minutes to monitor for s/s of adverse reaction. Next appointment made, stressed importance of staying within carissa period. Pt verb understanding.       78yM p/w LLQ and L inguinal pain and L groin pain. UA neg. Labs with borderline SARAHY - Cr 1.6 up from 0.8-0.9.  Bedside US w/ good b/l testicular flow, ?L epididymitis.  CT w/ 2x2x2 UVJ stone and surrounding perinephric stranding. Pain much improved.  Ok to dc with abx, urology f/u, return precautions.